# Patient Record
Sex: MALE | Race: WHITE
[De-identification: names, ages, dates, MRNs, and addresses within clinical notes are randomized per-mention and may not be internally consistent; named-entity substitution may affect disease eponyms.]

---

## 2020-04-26 ENCOUNTER — HOSPITAL ENCOUNTER (INPATIENT)
Dept: HOSPITAL 11 - JP.ED | Age: 60
LOS: 11 days | Discharge: HOSPICE | DRG: 853 | End: 2020-05-07
Attending: SURGERY | Admitting: SURGERY
Payer: MEDICARE

## 2020-04-26 DIAGNOSIS — J69.0: ICD-10-CM

## 2020-04-26 DIAGNOSIS — K81.0: Primary | ICD-10-CM

## 2020-04-26 DIAGNOSIS — R94.5: ICD-10-CM

## 2020-04-26 DIAGNOSIS — Z51.5: ICD-10-CM

## 2020-04-26 DIAGNOSIS — R65.21: ICD-10-CM

## 2020-04-26 DIAGNOSIS — Z79.899: ICD-10-CM

## 2020-04-26 DIAGNOSIS — G40.909: ICD-10-CM

## 2020-04-26 DIAGNOSIS — Q90.9: ICD-10-CM

## 2020-04-26 DIAGNOSIS — A41.51: ICD-10-CM

## 2020-04-26 DIAGNOSIS — K80.00: ICD-10-CM

## 2020-04-26 DIAGNOSIS — G30.9: ICD-10-CM

## 2020-04-26 DIAGNOSIS — J96.01: ICD-10-CM

## 2020-04-26 DIAGNOSIS — F02.80: ICD-10-CM

## 2020-04-26 DIAGNOSIS — J18.9: ICD-10-CM

## 2020-04-26 DIAGNOSIS — E86.0: ICD-10-CM

## 2020-04-26 DIAGNOSIS — Z79.890: ICD-10-CM

## 2020-04-26 DIAGNOSIS — H54.7: ICD-10-CM

## 2020-04-26 DIAGNOSIS — E87.6: ICD-10-CM

## 2020-04-26 PROCEDURE — 0FT44ZZ RESECTION OF GALLBLADDER, PERCUTANEOUS ENDOSCOPIC APPROACH: ICD-10-PCS | Performed by: SURGERY

## 2020-04-26 RX ADMIN — TAZOBACTAM SODIUM AND PIPERACILLIN SODIUM SCH MLS/HR: 375; 3 INJECTION, SOLUTION INTRAVENOUS at 19:10

## 2020-04-26 RX ADMIN — POTASSIUM CHLORIDE SCH: 750 CAPSULE, EXTENDED RELEASE ORAL at 14:22

## 2020-04-26 RX ADMIN — POTASSIUM CHLORIDE, SODIUM CHLORIDE, CALCIUM CHLORIDE, SODIUM LACTATE, AND DEXTROSE MONOHYDRATE SCH MLS/HR: 1.79; 6; .2; 3.1; 5 INJECTION, SOLUTION INTRAVENOUS at 11:25

## 2020-04-26 RX ADMIN — TAZOBACTAM SODIUM AND PIPERACILLIN SODIUM SCH MLS/HR: 375; 3 INJECTION, SOLUTION INTRAVENOUS at 13:59

## 2020-04-26 RX ADMIN — POTASSIUM CHLORIDE, SODIUM CHLORIDE, CALCIUM CHLORIDE, SODIUM LACTATE, AND DEXTROSE MONOHYDRATE SCH MLS/HR: 1.79; 6; .2; 3.1; 5 INJECTION, SOLUTION INTRAVENOUS at 21:54

## 2020-04-26 NOTE — PCM.CONS
H&P History of Present Illness





- General


Date of Service: 04/26/20


Admit Problem/Dx: 


 Admission Diagnosis/Problem





Admission Diagnosis/Problem      Acute cholecystitis








Source of Information: Provider.  No: Patient


History Limitations: Reports: Other (Sedation)





- History of Present Illness


Initial Comments - Free Text/Narative: 





CC: lethargic and febrile 





HPI: Trae was sent to the emergency room from his assisted living this morning 

with progressive lethargy and a fever greater than 101.  Trae has recently had 

anesthesia and is not able to provide any usable history.  History is gathered 

from emergency room personnel and notes from the assisted living.  His assisted 

living facility reports that he has been slowly declining over the past 2 weeks 

but more rapidly over the past 2 days.  He has been less interactive and unable 

to feed himself.  He has been refusing to walk.  Today was reportedly the first 

day that he had a fever.





Work-up in the emergency room was concerning for sepsis and acute 

cholecystitis.  He was taken to the operating room and had a laparoscopic 

cholecystectomy.  He continues to be hypotensive with systolic blood pressures 

in the 70s and 80s and occasionally in the 90s.  He is tachycardic.  His lactic 

acid level is elevated.  He has been admitted to the intensive care unit.  I 

was asked consult by Dr. Cuevas regarding management of severe sepsis and 

concern that he may be developing septic shock.





- Related Data


Allergies/Adverse Reactions: 


 Allergies











Allergy/AdvReac Type Severity Reaction Status Date / Time


 


No Known Allergies Allergy   Verified 04/26/20 05:39











Home Medications: 


 Home Meds





Acetaminophen 500 mg PO TID 08/13/19 [History]


Calcium Carbonate [Calcium] 600 mg PO DAILY 08/13/19 [History]


Donepezil HCl 10 mg PO DAILY 08/13/19 [History]


Multivitamin with Minerals [Multiple Vitamin] 1 tab PO DAILY 08/13/19 [History]


Omeprazole 40 mg PO DAILY 08/13/19 [History]


QUEtiapine Fumarate [Quetiapine Fumarate] 50 mg PO TID 08/13/19 [History]


levETIRAcetam [Levetiracetam ER] 750 mg PO BID 08/13/19 [History]


Hydrocortisone [Hydrocortisone 1% Crm] 1 applic TOP BID PRN 02/12/20 [History]


OXcarbazepine [Oxcarbazepine] 150 mg PO BID 02/12/20 [History]


Sertraline [Zoloft] 50 mg PO DAILY 02/12/20 [History]


Potassium Chloride 10 meq PO DAILY 04/26/20 [History]











Past Medical History


HEENT History: Reports: Impaired Vision


Cardiovascular History: Reports: None


Respiratory History: Reports: None


Gastrointestinal History: Reports: None


Genitourinary History: Reports: None


Musculoskeletal History: Reports: Other (See Below)


Other Musculoskeletal History: right hip pain


Neurological History: Reports: None


Psychiatric History: Reports: None


Endocrine/Metabolic History: Reports: None


Hematologic History: Reports: None


Immunologic History: Reports: None


Oncologic (Cancer) History: Reports: None


Dermatologic History: Reports: None





- Past Surgical History


Musculoskeletal Surgical History: Reports: None, Other (See Below)


Other Musculoskeletal Surgeries/Procedures:: cadaver bone LT leg





Social & Family History





- Family History


Family Medical History: Unobtainable (Patient sedated and lethargic)





- Tobacco Use


Smoking Status *Q: Never Smoker





- Caffeine Use


Caffeine Use: Reports: None





- Recreational Drug Use


Recreational Drug Use: No





H&P Review of Systems





- Review of Systems:


Review Of Systems: Unable To Obtain


Reason Not Obtained: Patient is sedated and very lethargic





Exam





- Exam


Exam: See Below





- Vital Signs


Vital Signs: 


 Last Vital Signs











Temp  37.1 C   04/26/20 06:10


 


Pulse  120 H  04/26/20 08:27


 


Resp  18   04/26/20 08:27


 


BP  83/43 L  04/26/20 08:27


 


Pulse Ox  94 L  04/26/20 07:27











Weight: 72.575 kg





- Exam


Quality Assessment: Supplemental Oxygen


General: Lethargic.  No: Alert, Cooperative, Mild Distress


HEENT: Conjunctiva Clear.  No: Mucosa Moist & Pink (Very dry)


Neck: Supple, Trachea Midline


Lungs: Clear to Auscultation, Normal Respiratory Effort


Cardiovascular: Regular Rhythm, Tachycardia.  No: Systolic Murmur


GI/Abdominal Exam: Soft, Tender.  No: Normal Bowel Sounds (Hypoactive)


Extremities: No Pedal Edema.  No: Increased Warmth


Peripheral Pulses: 1+: Dorsalis Pedis (L), Dorsalis Pedis (R)


Skin: Warm, Dry


Neuro Extensive - Mental Status: Withdraws to Pain.  No: Alert


Neuro Extensive - Motor, Sensory, Reflexes: No: Facial Palsy (R), Facial palsy (

L), Tremor


Psychiatric: No: Alert, Agitated





- Patient Data


Lab Results Last 24 hrs: 


 Laboratory Results - last 24 hr











  04/26/20 04/26/20 04/26/20 Range/Units





  05:39 05:50 05:50 


 


WBC   6.7   (4.5-11.0)  K/uL


 


RBC   4.01 L   (4.30-5.90)  M/uL


 


Hgb   13.1   (12.0-15.0)  g/dL


 


Hct   40.0   (40.0-54.0)  %


 


MCV   100 H   (80-98)  fL


 


MCH   33 H   (27-31)  pg


 


MCHC   33   (32-36)  %


 


Plt Count   157   (150-400)  K/uL


 


Add Manual Diff   Yes   


 


Neutrophils % (Manual)   72 H   (36-66)  %


 


Band Neutrophils %   18 H   (5-11)  %


 


Lymphocytes % (Manual)   7 L   (24-44)  %


 


Monocytes % (Manual)   3   (2-6)  %


 


Sodium    143  (140-148)  mmol/L


 


Potassium    3.4 L  (3.6-5.2)  mmol/L


 


Chloride    106  (100-108)  mmol/L


 


Carbon Dioxide    25  (21-32)  mmol/L


 


Anion Gap    15.4 H  (5.0-14.0)  mmol/L


 


BUN    26 H  (7-18)  mg/dL


 


Creatinine    1.1  (0.8-1.3)  mg/dL


 


Est Cr Clr Drug Dosing    69.95  mL/min


 


Estimated GFR (MDRD)    > 60  (>60)  


 


Glucose    86  ()  mg/dL


 


Lactic Acid     (0.4-2.0)  mmol/L


 


Calcium    8.0 L  (8.5-10.1)  mg/dL


 


Total Bilirubin    0.6  (0.2-1.0)  mg/dL


 


AST    128 H  (15-37)  U/L


 


ALT    171 H  (12-78)  U/L


 


Alkaline Phosphatase    291 H  ()  U/L


 


C-Reactive Protein     (0.0-0.3)  mg/dL


 


Total Protein    6.2 L  (6.4-8.2)  g/dL


 


Albumin    2.5 L  (3.4-5.0)  g/dL


 


Globulin    3.7 H  (2.3-3.5)  g/dL


 


Albumin/Globulin Ratio    0.7 L  (1.2-2.2)  


 


Lipase     ()  U/L


 


Urine Color  Yellow    (YELLOW)  


 


Urine Appearance  Clear    (CLEAR)  


 


Urine pH  7.0    (5.0-8.0)  


 


Ur Specific Gravity  1.020    (1.008-1.030)  


 


Urine Protein  100 H    (NEGATIVE)  mg/dL


 


Urine Glucose (UA)  Negative    (NEGATIVE)  mg/dL


 


Urine Ketones  Trace H    (NEGATIVE)  mg/dL


 


Urine Occult Blood  Trace-intact H    (NEGATIVE)  


 


Urine Nitrite  Negative    (NEGATIVE)  


 


Urine Bilirubin  Small H    (NEGATIVE)  


 


Urine Urobilinogen  1.0    (0.2-1.0)  EU/dL


 


Ur Leukocyte Esterase  Negative    (NEGATIVE)  


 


Urine RBC  0-5    (0-5)  


 


Urine WBC  0-5    (0-5)  


 


Ur Epithelial Cells  Few    


 


Amorphous Sediment  Few    


 


Urine Bacteria  Few    


 


Urine Mucus  Few    














  04/26/20 04/26/20 04/26/20 Range/Units





  05:50 06:31 06:33 


 


WBC     (4.5-11.0)  K/uL


 


RBC     (4.30-5.90)  M/uL


 


Hgb     (12.0-15.0)  g/dL


 


Hct     (40.0-54.0)  %


 


MCV     (80-98)  fL


 


MCH     (27-31)  pg


 


MCHC     (32-36)  %


 


Plt Count     (150-400)  K/uL


 


Add Manual Diff     


 


Neutrophils % (Manual)     (36-66)  %


 


Band Neutrophils %     (5-11)  %


 


Lymphocytes % (Manual)     (24-44)  %


 


Monocytes % (Manual)     (2-6)  %


 


Sodium     (140-148)  mmol/L


 


Potassium     (3.6-5.2)  mmol/L


 


Chloride     (100-108)  mmol/L


 


Carbon Dioxide     (21-32)  mmol/L


 


Anion Gap     (5.0-14.0)  mmol/L


 


BUN     (7-18)  mg/dL


 


Creatinine     (0.8-1.3)  mg/dL


 


Est Cr Clr Drug Dosing     mL/min


 


Estimated GFR (MDRD)     (>60)  


 


Glucose     ()  mg/dL


 


Lactic Acid  3.1 H    (0.4-2.0)  mmol/L


 


Calcium     (8.5-10.1)  mg/dL


 


Total Bilirubin     (0.2-1.0)  mg/dL


 


AST     (15-37)  U/L


 


ALT     (12-78)  U/L


 


Alkaline Phosphatase     ()  U/L


 


C-Reactive Protein   19.72 H   (0.0-0.3)  mg/dL


 


Total Protein     (6.4-8.2)  g/dL


 


Albumin     (3.4-5.0)  g/dL


 


Globulin     (2.3-3.5)  g/dL


 


Albumin/Globulin Ratio     (1.2-2.2)  


 


Lipase    32 L  ()  U/L


 


Urine Color     (YELLOW)  


 


Urine Appearance     (CLEAR)  


 


Urine pH     (5.0-8.0)  


 


Ur Specific Gravity     (1.008-1.030)  


 


Urine Protein     (NEGATIVE)  mg/dL


 


Urine Glucose (UA)     (NEGATIVE)  mg/dL


 


Urine Ketones     (NEGATIVE)  mg/dL


 


Urine Occult Blood     (NEGATIVE)  


 


Urine Nitrite     (NEGATIVE)  


 


Urine Bilirubin     (NEGATIVE)  


 


Urine Urobilinogen     (0.2-1.0)  EU/dL


 


Ur Leukocyte Esterase     (NEGATIVE)  


 


Urine RBC     (0-5)  


 


Urine WBC     (0-5)  


 


Ur Epithelial Cells     


 


Amorphous Sediment     


 


Urine Bacteria     


 


Urine Mucus     











Result Diagrams: 


 04/26/20 05:50





 04/26/20 05:50


Roberto Results Last 24 hrs: 


 Microbiology











 04/26/20 09:15 Gram Stain - Final





 Abdominal Fluid - Aspirate 


 


 04/26/20 05:55 Aerobic Blood Culture - Preliminary





 Blood - Venous - Lab Draw 


 


 04/26/20 05:50 Aerobic Blood Culture - Preliminary





 Blood - Venous 











Imaging Impressions Last 24 hrs: 





Chest x-ray-image personally reviewed-lungs clear with no mass, infiltrate or 

effusion.  Heart size is normal.





Right upper quadrant ultrasound-gallbladder is thickened and dilated.  There is 

sludge and a few small stones.  There is significant concern for acute 

cholecystitis versus subacute.  Sonographic Stephens sign was reported as 

negative.





Sepsis Event Note





- Evaluation


Sepsis Screening Result: Severe Sepsis Risk


Current Stage of Sepsis: Severe Sepsis


Possible Source of Sepsis: GI Tract/Intra-abdominal





- Focused Exam


Sepsis Event Note Statement: Focused Sepsis Exam Completed


Vital Signs: 


 Vital Signs











  Temp Pulse Resp BP Pulse Ox


 


 04/26/20 08:27   120 H  18  83/43 L 


 


 04/26/20 08:15   118 H  19  83/40 L 


 


 04/26/20 07:27   119 H  16  89/41 L  94 L


 


 04/26/20 07:12   119 H  19  89/46 L  95


 


 04/26/20 06:42   121 H  18  93/46 L  98


 


 04/26/20 06:27   128 H  17  85/49 L  99


 


 04/26/20 06:22   125 H  20  84/46 L  99


 


 04/26/20 06:12   116 H  22 H  80/42 L  96


 


 04/26/20 06:10  37.1 C    


 


 04/26/20 06:00   122 H  18  86/44 L  97


 


 04/26/20 05:55   123 H  20  87/42 L  91 L


 


 04/26/20 05:54  38.2 C H  133 H  22 H  97/54 L  94 L


 


 04/26/20 05:20  38.2 C H  133 H  18  97/54 L  94 L











Respiratory Effort Without Exertion: Other (see below) (Normal)


Heart Sounds: Other (see below) (Tachycardic)


Capillary Refill, Detail: Less than/Equal to (</=) 2 Seconds


Pulse Description: 1+ Thready


Peripheral Pulse Location: Dorsalis Pedis


Skin Exam (Focused Sepsis): Normal Turgor


Date Exam was Performed: 04/26/20


Time Exam was Performed: 11:18





*Q Meaningful Use (ADM)





- VTE Risk Assess *Q


Each Risk Factor Represents 1 Point: Age 41 - 59 years, Sepsis


Total Score 1 Point Risk Factors: 2


Each Risk Factor Represents 2 Points: Laparoscopic surgery greater than 45 

minutes


Total Score 2 Point Risk Factors: 2


Each Risk Factor Represents 3 Points: None


Total Score 3 Point Risk Factors: 0


Each Risk Factor Represents 5 Points: None


Total Score 5 Point Risk Factors: 0


Venous Thromboembolism Risk Factor Score *Q: 4





Consult PN Assessment/Plan


POD#: 0


Procedures: 


Procedures





OFFICE/OUTPATIENT VISIT NEW (09/03/19)


URINALYSIS AUTO W/O SCOPE (10/25/19)


X-RAY EXAM HIP UNI 2-3 VIEWS (02/11/20)








(1) Severe sepsis


SNOMED Code(s): 42175993


   Code(s): A41.9 - SEPSIS, UNSPECIFIED ORGANISM; R65.20 - SEVERE SEPSIS 

WITHOUT SEPTIC SHOCK   Current Visit: Yes   





(2) Hypokalemia


SNOMED Code(s): 23929991


   Code(s): E87.6 - HYPOKALEMIA   Current Visit: Yes   





(3) Down's syndrome


SNOMED Code(s): 26033978


   Code(s): Q90.9 - DOWN SYNDROME, UNSPECIFIED   Current Visit: No   





(4) Acute cholecystitis


SNOMED Code(s): 76118787


   Code(s): K81.0 - ACUTE CHOLECYSTITIS   Current Visit: Yes   


Problem List Initiated/Reviewed/Updated: Yes


My Orders Last 24 Hours: 


My Active Orders





04/26/20 10:50


LACTIC ACID [CHEM] Routine 





04/26/20 11:03


Admission Status [Patient Status] [ADT] Routine 





04/26/20 11:07


Ondansetron [Zofran ODT]   4 mg PO Q4H PRN 


Ondansetron [Zofran]   4 mg IVPUSH Q4H PRN 





04/26/20 11:15


Dextrose 5%-Lactated Ringers with KCl 20 mEq @ 125 mL/Hr (1000 mL) Dextrose 5%-

Lact Ringers w/KCl [D5 LR with 20 mEq KCl] 1,000 ml IV ASDIRECTED 











Plan: 





ASSESSMENT AND PLAN - 





Acute cholecystitis with severe sepsis-patient still borderline hypotensive and 

tachycardic despite aggressive fluid resuscitation.  He may need vasopressor 

support in the near future.  He is on appropriate broad-spectrum antibiotics.  

Cultures have been obtained.  Lactic acid level will be repeated.  He is in the 

intensive care unit.


-Agree with current aggressive broad-spectrum antibiotics


-Continue IV fluids


-Consider vasopressor support if still hypotensive


-Follow-up cultures





Hypokalemia-will be supplemented.





Down syndrome-significant disability at this time.  Difficulty with 

communication.


-Continue home medications





Maintenance issues - 


- DVT prophylaxis -mechanical today, enoxaparin started tomorrow


- Nutrition -advance per surgical instructions


- Maldonado catheter -placed in the emergency room for strict intake and output 

monitoring in a critically ill patient





Admission justification -this patient will be admitted for inpatient services 

and is medically appropriate meeting medical necessity for inpatient admission 

as outlined in my documentation.  I reasonably expect the patient will require 

inpatient services that span a period time over 2 midnights. I reasonably 

expect this patient to be discharged or transferred within 96 hours after 

admission to the [facility].





Florian Keita M.D.


Requesting Provider: Dr. Cuevas


Date Consult Requested: 04/26/20


Reason for Consult: Severe sepsis


Patient History Reviewed: Yes


Admission H&P Reviewed: No (Not currently available)


Notified Requestor: Yes


Time Spent (in minutes): 60

## 2020-04-26 NOTE — CRLUS
INDICATION:



Pain in the right upper abdomen.



TECHNIQUE:



Abdominal ultrasound.



FINDINGS:



Right kidney measures 11.2 cm and is negative for hydronephrosis. Pancreas 

where seen is grossly normal. Proximal abdominal aorta normal in caliber. 

Liver where seen negative for mass or bile duct dilatation. Proximal 

abdominal inferior cava normal in caliber and patent with flow in the 

appropriate direction. The gallbladder is distended to moderately prominent 

degree. Small stones in the gallbladder. Moderate amount of dense sludge in 

the gallbladder. Gallbladder wall is irregular and moderately thickened 

with measurements of the gallbladder wall being up to 6 mm. Despite the 

absence of a sonographic Stephens`s sign, cholecystitis should be the primary 

consideration and should be excluded. Other gallbladder wall infiltrative 

process such as neoplasm would be less likely since there is no discrete 

mass however this latter finding is not entirely excluded. Common bile duct 

measures 3.9 mm which is within normal limits. Main portal vein is patent. 

The gallbladder was distended to a length of 10-11 cm. Remainder negative.



IMPRESSION:



Moderate irregular dense sludge in the gallbladder with small stones with 

the gallbladder wall being moderately thickened and irregular. Findings 

suggest cholecystitis which could be acute with possible subacute or 

chronic component. Other infiltrative process of the gallbladder such as 

neoplasm is not entirely excluded. No biliary dilatation. The gallbladder 

is prominently distended. Other findings as above.



Dictated by Dylan Almanzar MD @ Apr 26 2020  7:43AM



Signed by Dr. Dylan Almanzar @ Apr 26 2020  7:44AM

## 2020-04-26 NOTE — EDM.PDOC
<Can Carter - Last Filed: 04/26/20 07:18>





ED HPI GENERAL MEDICAL PROBLEM





- General


Chief Complaint: Fever


Stated Complaint: MEDICAL VIA NORTH


Time Seen by Provider: 04/26/20 05:42





- Related Data


 Allergies











Allergy/AdvReac Type Severity Reaction Status Date / Time


 


No Known Allergies Allergy   Verified 04/26/20 05:39











Home Meds: 


 Home Meds





Acetaminophen 500 mg PO TID 08/13/19 [History]


Calcium Carbonate [Calcium] 600 mg PO DAILY 08/13/19 [History]


Donepezil HCl 10 mg PO DAILY 08/13/19 [History]


Multivitamin with Minerals [Multiple Vitamin] 1 tab PO DAILY 08/13/19 [History]


Omeprazole 40 mg PO DAILY 08/13/19 [History]


QUEtiapine Fumarate [Quetiapine Fumarate] 50 mg PO TID 08/13/19 [History]


levETIRAcetam [Levetiracetam ER] 750 mg PO BID 08/13/19 [History]


Hydrocortisone [Hydrocortisone 1% Crm] 1 applic TOP BID PRN 02/12/20 [History]


OXcarbazepine [Oxcarbazepine] 150 mg PO BID 02/12/20 [History]


Sertraline [Zoloft] 50 mg PO DAILY 02/12/20 [History]


Potassium Chloride 10 meq PO DAILY 04/26/20 [History]











Course





- Vital Signs


Text/Narrative:: 





Dr. Cuevas called @ 0720h


Last Recorded V/S: 


 Last Vital Signs











Temp  36.0 C L  04/26/20 16:52


 


Pulse  109 H  04/26/20 18:00


 


Resp  18   04/26/20 18:00


 


BP  94/58 L  04/26/20 18:00


 


Pulse Ox  97   04/26/20 18:00














- Orders/Labs/Meds


Orders: 


 Active Orders 24 hr











 Category Date Time Status


 


 Ambulate [RC] QID Care  04/26/20 08:40 Active


 


 Communication Order [RC] ASDIRECTED Care  04/26/20 08:40 Active


 


 Communication Order [RC] UPON Care  04/26/20 08:40 Active


 


 Dorsiflex/Plantar flex x 10 [RC] Q4HR Care  04/26/20 08:40 Active


 


 Head of Bed Elevation [RC] ASDIRECTED Care  04/26/20 08:40 Active


 


 Intake and Output [RC] ASDIRECTED Care  04/26/20 08:40 Inactive


 


 Notify Provider Vital Signs [RC] ASDIRECTED Care  04/26/20 08:40 Active


 


 Pneumonia Education [RC] UPON Care  04/26/20 08:40 Active


 


 RT Incentive Spirometry [RC] Q1HWA Care  04/26/20 08:40 Active


 


 Ready for Discharge [RC] PER UNIT ROUTINE Care  04/26/20 08:40 Inactive


 


 Ready for Discharge [RC] UPON Care  04/26/20 08:40 Inactive


 


 Turn, Cough, Deep Breathe [RC] .PRN Care  04/26/20 08:40 Active


 


 Up to Chair [RC] ASDIRECTED Care  04/26/20 08:40 Active


 


 Vital Signs [RC] PER UNIT ROUTINE Care  04/26/20 08:40 Inactive


 


 Nutrition Reassessment/Plan, Adult [Consult to Cons  04/26/20 08:42 Active





 Dietician] [CONS] Routine   


 


 Advance Diet Instructions [DIET] Diet  04/26/20 Breakfast Active


 


 Advance Diet Instructions [DIET] Diet  04/26/20 Dinner Active


 


 Chest 1V Frontal [CR] Stat Exams  04/26/20 05:41 Taken


 


 CBC WITH AUTO DIFF [HEME] Routine Lab  04/27/20 05:11 Ordered


 


 COMPREHENSIVE METABOLIC PN,CMP [CHEM] Routine Lab  04/27/20 05:11 Ordered


 


 CULTURE ANAEROBIC [RM] Routine Lab  04/26/20 09:15 Results


 


 CULTURE BLOOD [BC] Urgent Lab  04/26/20 05:50 Results


 


 CULTURE BLOOD [BC] Urgent Lab  04/26/20 05:55 Results


 


 CULTURE URINE [RM] Stat Lab  04/26/20 05:40 Received


 


 CULTURE WOUND + SMEAR [RM] Routine Lab  04/26/20 09:15 Results


 


 Acetaminophen/HYDROcodone [Norco 325-5 MG] Med  04/26/20 08:40 Active





 1 tab PO Q4H PRN   


 


 Benzocaine/Cetylpyrd/Menthol [Cepacol Sore Throat] Med  04/26/20 08:40 Active





 1 lozenge MUCMEM Q4H PRN   


 


 Docusate Sodium [Colace] Med  04/26/20 08:40 Active





 100 mg PO BID PRN   


 


 Donepezil [Aricept] Med  04/26/20 14:00 Active





 10 mg PO DAILY   


 


 Enoxaparin [Lovenox] Med  04/27/20 09:00 Active





 40 mg SUBCUT DAILY   


 


 Hydrocortisone [Hydrocortisone 1% Crm] Med  04/26/20 08:44 Active





 0 gm TOP BID PRN   


 


 OXcarbazepine [Trileptal] Med  04/26/20 21:00 Active





 150 mg PO BID   


 


 Pantoprazole [ProTONIX***] Med  04/26/20 14:00 Active





 40 mg PO ACBREAKFAST   


 


 Piperacillin/Tazobactam/Dext [Zosyn in Dextrose Iso- Med  04/26/20 14:00 Active





 Osmotic 3.375 GM] 3.375 gm   





 Premix Bag 1 bag   





 IV Q6H   


 


 Potassium Chloride Med  04/26/20 14:00 Active





 10 meq PO DAILY   


 


 QUEtiapine [SEROqueL] Med  04/26/20 14:00 Active





 50 mg PO TID   


 


 Ropivacaine [Naropin 0.5%] 36 ml Med  04/26/20 09:00 Active





 dexAMETHasone [Dexamethasone] 8 mg   





 EPINEPHrine [Adrenalin] 0.4 mg   





 Sodium Chloride 0.9% [Normal Saline] 41.6 ml   





 NERVRT ASDIRECTED   


 


 Sertraline [Zoloft] Med  04/26/20 14:00 Active





 50 mg PO DAILY   


 


 Zolpidem [Ambien] Med  04/26/20 08:40 Active





 5 mg PO BEDTIME PRN   


 


 hydrOXYzine HCL [Vistaril] Med  04/26/20 08:40 Active





 100 mg IM Q4H PRN   


 


 levETIRAcetam [Keppra] Med  04/26/20 14:00 Active





 750 mg PO BID   


 


 Abdominal Binder [OM.PC] Routine Oth  04/26/20 08:40 Ordered


 


 Blood Culture x2 Reflex Set [OM.PC] Urgent Oth  04/26/20 05:39 Ordered


 


 Blood Culture x2 Reflex Set [OM.PC] Urgent Oth  04/26/20 10:32 Ordered


 


 Convert IV to Saline Lock [OM.PC] Routine Oth  04/26/20 08:40 Ordered


 


 Procedure Education [OM.PC] Routine Oth  04/26/20 08:40 Ordered


 


 Sequential Compression Device [OM.PC] Routine Oth  04/26/20 08:40 Ordered


 


 Resuscitation Status Routine Resus Stat  04/26/20 08:40 Ordered








 Medication Orders





Hydrocodone Bitart/Acetaminophen (Norco 325-5 Mg)  1 tab PO Q4H PRN


   PRN Reason: Pain (moderate 4-6)


Benzocaine/Menthol (Cepacol Sore Throat)  1 lozenge MUCMEM Q4H PRN


   PRN Reason: Sore Throat


Ropivacaine 36 ml/Dexamethasone 8 mg/Epinephrine HCl 0.4 mg/ Sodium Chloride 

41.6 ml  0 ml NERVRT ASDIRECTED [facility]


   Last Admin: 04/26/20 09:35  Dose: 80 syringe


Docusate Sodium (Colace)  100 mg PO BID PRN


   PRN Reason: Constipation


Donepezil HCl (Aricept)  10 mg PO DAILY [facility]


   Last Admin: 04/26/20 14:22  Dose:  


Enoxaparin Sodium (Lovenox)  40 mg SUBCUT DAILY [facility]


Hydrocortisone (Hydrocortisone 1% Crm)  0 gm TOP BID PRN


   PRN Reason: Rash


Hydroxyzine HCl (Vistaril)  100 mg IM Q4H PRN


   PRN Reason: Breakthrough Pain


Piperacillin/Tazobactam/ (Dextrose 3.375 gm/ Premix)  50 mls @ 100 mls/hr IV 

Q6H [facility]


   Last Admin: 04/26/20 13:59  Dose: 100 mls/hr


Potassium Cl/Dextrose/Lact Ringer's (D5 Lr With 20 Meq Kcl)  1,000 mls @ 125 mls

/hr IV ASDIRECTED [facility]


   Last Admin: 04/26/20 11:25  Dose: 125 mls/hr


Vancomycin HCl 1 gm/ Sodium (Chloride)  250 mls @ 166.667 mls/hr IV Q12H [facility]


   Last Admin: 04/26/20 12:13  Dose: 166.667 mls/hr


Norepinephrine Bitartrate 4 mg (/ Dextrose/Water)  250 mls @ 7.5 mls/hr IV 

TITRATE [facility]; Protocol


   Last Admin: 04/26/20 13:11  Dose: 2 mcg/min, 7.5 mls/hr


Levetiracetam (Keppra)  750 mg PO BID [facility]


   Last Admin: 04/26/20 14:22  Dose:  


Ondansetron HCl (Zofran Odt)  4 mg PO Q4H PRN


   PRN Reason: Nausea/Vomiting


Ondansetron HCl (Zofran)  4 mg IVPUSH Q4H PRN


   PRN Reason: Nausea/Vomiting


Oxcarbazepine (Trileptal)  150 mg PO BID [facility]


Pantoprazole Sodium (Protonix***)  40 mg PO ACBREAKFAST [facility]


   Last Admin: 04/26/20 14:23  Dose:  


Potassium Chloride (Potassium Chloride)  10 meq PO DAILY [facility]


   Last Admin: 04/26/20 14:22  Dose:  


Quetiapine Fumarate (Seroquel)  50 mg PO TID [facility]


   Last Admin: 04/26/20 14:23  Dose:  


Sertraline HCl (Zoloft)  50 mg PO DAILY [facility]


   Last Admin: 04/26/20 14:23  Dose:  


Zolpidem Tartrate (Ambien)  5 mg PO BEDTIME PRN


   PRN Reason: Sleep








Labs: 


 Laboratory Tests











  04/26/20 04/26/20 04/26/20 Range/Units





  05:39 05:50 05:50 


 


WBC   6.7   (4.5-11.0)  K/uL


 


RBC   4.01 L   (4.30-5.90)  M/uL


 


Hgb   13.1   (12.0-15.0)  g/dL


 


Hct   40.0   (40.0-54.0)  %


 


MCV   100 H   (80-98)  fL


 


MCH   33 H   (27-31)  pg


 


MCHC   33   (32-36)  %


 


Plt Count   157   (150-400)  K/uL


 


Add Manual Diff   Yes   


 


Neutrophils % (Manual)   72 H   (36-66)  %


 


Band Neutrophils %   18 H   (5-11)  %


 


Lymphocytes % (Manual)   7 L   (24-44)  %


 


Monocytes % (Manual)   3   (2-6)  %


 


Sodium    143  (140-148)  mmol/L


 


Potassium    3.4 L  (3.6-5.2)  mmol/L


 


Chloride    106  (100-108)  mmol/L


 


Carbon Dioxide    25  (21-32)  mmol/L


 


Anion Gap    15.4 H  (5.0-14.0)  mmol/L


 


BUN    26 H  (7-18)  mg/dL


 


Creatinine    1.1  (0.8-1.3)  mg/dL


 


Est Cr Clr Drug Dosing    69.95  mL/min


 


Estimated GFR (MDRD)    > 60  (>60)  


 


Glucose    86  ()  mg/dL


 


Lactic Acid     (0.4-2.0)  mmol/L


 


Calcium    8.0 L  (8.5-10.1)  mg/dL


 


Total Bilirubin    0.6  (0.2-1.0)  mg/dL


 


AST    128 H  (15-37)  U/L


 


ALT    171 H  (12-78)  U/L


 


Alkaline Phosphatase    291 H  ()  U/L


 


C-Reactive Protein     (0.0-0.3)  mg/dL


 


Total Protein    6.2 L  (6.4-8.2)  g/dL


 


Albumin    2.5 L  (3.4-5.0)  g/dL


 


Globulin    3.7 H  (2.3-3.5)  g/dL


 


Albumin/Globulin Ratio    0.7 L  (1.2-2.2)  


 


Lipase     ()  U/L


 


Urine Color  Yellow    (YELLOW)  


 


Urine Appearance  Clear    (CLEAR)  


 


Urine pH  7.0    (5.0-8.0)  


 


Ur Specific Gravity  1.020    (1.008-1.030)  


 


Urine Protein  100 H    (NEGATIVE)  mg/dL


 


Urine Glucose (UA)  Negative    (NEGATIVE)  mg/dL


 


Urine Ketones  Trace H    (NEGATIVE)  mg/dL


 


Urine Occult Blood  Trace-intact H    (NEGATIVE)  


 


Urine Nitrite  Negative    (NEGATIVE)  


 


Urine Bilirubin  Small H    (NEGATIVE)  


 


Urine Urobilinogen  1.0    (0.2-1.0)  EU/dL


 


Ur Leukocyte Esterase  Negative    (NEGATIVE)  


 


Urine RBC  0-5    (0-5)  


 


Urine WBC  0-5    (0-5)  


 


Ur Epithelial Cells  Few    


 


Amorphous Sediment  Few    


 


Urine Bacteria  Few    


 


Urine Mucus  Few    














  04/26/20 04/26/20 04/26/20 Range/Units





  05:50 06:31 06:33 


 


WBC     (4.5-11.0)  K/uL


 


RBC     (4.30-5.90)  M/uL


 


Hgb     (12.0-15.0)  g/dL


 


Hct     (40.0-54.0)  %


 


MCV     (80-98)  fL


 


MCH     (27-31)  pg


 


MCHC     (32-36)  %


 


Plt Count     (150-400)  K/uL


 


Add Manual Diff     


 


Neutrophils % (Manual)     (36-66)  %


 


Band Neutrophils %     (5-11)  %


 


Lymphocytes % (Manual)     (24-44)  %


 


Monocytes % (Manual)     (2-6)  %


 


Sodium     (140-148)  mmol/L


 


Potassium     (3.6-5.2)  mmol/L


 


Chloride     (100-108)  mmol/L


 


Carbon Dioxide     (21-32)  mmol/L


 


Anion Gap     (5.0-14.0)  mmol/L


 


BUN     (7-18)  mg/dL


 


Creatinine     (0.8-1.3)  mg/dL


 


Est Cr Clr Drug Dosing     mL/min


 


Estimated GFR (MDRD)     (>60)  


 


Glucose     ()  mg/dL


 


Lactic Acid  3.1 H    (0.4-2.0)  mmol/L


 


Calcium     (8.5-10.1)  mg/dL


 


Total Bilirubin     (0.2-1.0)  mg/dL


 


AST     (15-37)  U/L


 


ALT     (12-78)  U/L


 


Alkaline Phosphatase     ()  U/L


 


C-Reactive Protein   19.72 H   (0.0-0.3)  mg/dL


 


Total Protein     (6.4-8.2)  g/dL


 


Albumin     (3.4-5.0)  g/dL


 


Globulin     (2.3-3.5)  g/dL


 


Albumin/Globulin Ratio     (1.2-2.2)  


 


Lipase    32 L  ()  U/L


 


Urine Color     (YELLOW)  


 


Urine Appearance     (CLEAR)  


 


Urine pH     (5.0-8.0)  


 


Ur Specific Gravity     (1.008-1.030)  


 


Urine Protein     (NEGATIVE)  mg/dL


 


Urine Glucose (UA)     (NEGATIVE)  mg/dL


 


Urine Ketones     (NEGATIVE)  mg/dL


 


Urine Occult Blood     (NEGATIVE)  


 


Urine Nitrite     (NEGATIVE)  


 


Urine Bilirubin     (NEGATIVE)  


 


Urine Urobilinogen     (0.2-1.0)  EU/dL


 


Ur Leukocyte Esterase     (NEGATIVE)  


 


Urine RBC     (0-5)  


 


Urine WBC     (0-5)  


 


Ur Epithelial Cells     


 


Amorphous Sediment     


 


Urine Bacteria     


 


Urine Mucus     














  04/26/20 Range/Units





  10:50 


 


WBC   (4.5-11.0)  K/uL


 


RBC   (4.30-5.90)  M/uL


 


Hgb   (12.0-15.0)  g/dL


 


Hct   (40.0-54.0)  %


 


MCV   (80-98)  fL


 


MCH   (27-31)  pg


 


MCHC   (32-36)  %


 


Plt Count   (150-400)  K/uL


 


Add Manual Diff   


 


Neutrophils % (Manual)   (36-66)  %


 


Band Neutrophils %   (5-11)  %


 


Lymphocytes % (Manual)   (24-44)  %


 


Monocytes % (Manual)   (2-6)  %


 


Sodium   (140-148)  mmol/L


 


Potassium   (3.6-5.2)  mmol/L


 


Chloride   (100-108)  mmol/L


 


Carbon Dioxide   (21-32)  mmol/L


 


Anion Gap   (5.0-14.0)  mmol/L


 


BUN   (7-18)  mg/dL


 


Creatinine   (0.8-1.3)  mg/dL


 


Est Cr Clr Drug Dosing   mL/min


 


Estimated GFR (MDRD)   (>60)  


 


Glucose   ()  mg/dL


 


Lactic Acid  4.8 H  (0.4-2.0)  mmol/L


 


Calcium   (8.5-10.1)  mg/dL


 


Total Bilirubin   (0.2-1.0)  mg/dL


 


AST   (15-37)  U/L


 


ALT   (12-78)  U/L


 


Alkaline Phosphatase   ()  U/L


 


C-Reactive Protein   (0.0-0.3)  mg/dL


 


Total Protein   (6.4-8.2)  g/dL


 


Albumin   (3.4-5.0)  g/dL


 


Globulin   (2.3-3.5)  g/dL


 


Albumin/Globulin Ratio   (1.2-2.2)  


 


Lipase   ()  U/L


 


Urine Color   (YELLOW)  


 


Urine Appearance   (CLEAR)  


 


Urine pH   (5.0-8.0)  


 


Ur Specific Gravity   (1.008-1.030)  


 


Urine Protein   (NEGATIVE)  mg/dL


 


Urine Glucose (UA)   (NEGATIVE)  mg/dL


 


Urine Ketones   (NEGATIVE)  mg/dL


 


Urine Occult Blood   (NEGATIVE)  


 


Urine Nitrite   (NEGATIVE)  


 


Urine Bilirubin   (NEGATIVE)  


 


Urine Urobilinogen   (0.2-1.0)  EU/dL


 


Ur Leukocyte Esterase   (NEGATIVE)  


 


Urine RBC   (0-5)  


 


Urine WBC   (0-5)  


 


Ur Epithelial Cells   


 


Amorphous Sediment   


 


Urine Bacteria   


 


Urine Mucus   











Meds: 


Medications











Generic Name Dose Route Start Last Admin





  Trade Name Freq  PRN Reason Stop Dose Admin


 


Hydrocodone Bitart/Acetaminophen  1 tab  04/26/20 08:40  





  Norco 325-5 Mg  PO   





  Q4H PRN   





  Pain (moderate 4-6)   





     





     





     


 


Benzocaine/Menthol  1 lozenge  04/26/20 08:40  





  Cepacol Sore Throat  MUCMEM   





  Q4H PRN   





  Sore Throat   





     





     





     


 


Ropivacaine 36 ml/  0 ml  04/26/20 09:00  04/26/20 09:35





Dexamethasone 8 mg/  NERVRT   80 syringe





Epinephrine HCl 0.4 mg/ Sodium  ASDIRECTED VASYL   Administration





Chloride 41.6 ml     





     





     





     


 


Docusate Sodium  100 mg  04/26/20 08:40  





  Colace  PO   





  BID PRN   





  Constipation   





     





     





     


 


Donepezil HCl  10 mg  04/26/20 14:00  04/26/20 14:22





  Aricept  PO   Not Given





  DAILY VASYL   





     





     





     





     


 


Enoxaparin Sodium  40 mg  04/27/20 09:00  





  Lovenox  SUBCUT   





  DAILY VASYL   





     





     





     





     


 


Hydrocortisone  0 gm  04/26/20 08:44  





  Hydrocortisone 1% Crm  TOP   





  BID PRN   





  Rash   





     





     





     


 


Hydroxyzine HCl  100 mg  04/26/20 08:40  





  Vistaril  IM   





  Q4H PRN   





  Breakthrough Pain   





     





     





     


 


Piperacillin/Tazobactam/  50 mls @ 100 mls/hr  04/26/20 14:00  04/26/20 13:59





  Dextrose 3.375 gm/ Premix  IV   100 mls/hr





  Q6H VASYL   Administration





     





     





     





     


 


Potassium Cl/Dextrose/Lact Ringer's  1,000 mls @ 125 mls/hr  04/26/20 11:15  04/ 26/20 11:25





  D5 Lr With 20 Meq Kcl  IV   125 mls/hr





  ASDIRECTED VASYL   Administration





     





     





     





     


 


Vancomycin HCl 1 gm/ Sodium  250 mls @ 166.667 mls/hr  04/26/20 12:00  04/26/20 

12:13





  Chloride  IV   166.667 mls/hr





  Q12H VASYL   Administration





     





     





     





     


 


Norepinephrine Bitartrate 4 mg  250 mls @ 7.5 mls/hr  04/26/20 12:45  04/26/20 

13:11





  / Dextrose/Water  IV   2 mcg/min





  TITRATE VASYL   7.5 mls/hr





     Administration





     





  Protocol   





  2 MCG/MIN   


 


Levetiracetam  750 mg  04/26/20 14:00  04/26/20 14:22





  Keppra  PO   Not Given





  BID [facility]   





     





     





     





     


 


Ondansetron HCl  4 mg  04/26/20 11:07  





  Zofran Odt  PO   





  Q4H PRN   





  Nausea/Vomiting   





     





     





     


 


Ondansetron HCl  4 mg  04/26/20 11:07  





  Zofran  IVPUSH   





  Q4H PRN   





  Nausea/Vomiting   





     





     





     


 


Oxcarbazepine  150 mg  04/26/20 21:00  





  Trileptal  PO   





  BID [facility]   





     





     





     





     


 


Pantoprazole Sodium  40 mg  04/26/20 14:00  04/26/20 14:23





  Protonix***  PO   Not Given





  ACBREAKFAST [facility]   





     





     





     





     


 


Potassium Chloride  10 meq  04/26/20 14:00  04/26/20 14:22





  Potassium Chloride  PO   Not Given





  DAILY [facility]   





     





     





     





     


 


Quetiapine Fumarate  50 mg  04/26/20 14:00  04/26/20 14:23





  Seroquel  PO   Not Given





  TID [facility]   





     





     





     





     


 


Sertraline HCl  50 mg  04/26/20 14:00  04/26/20 14:23





  Zoloft  PO   Not Given





  DAILY [facility]   





     





     





     





     


 


Zolpidem Tartrate  5 mg  04/26/20 08:40  





  Ambien  PO   





  BEDTIME PRN   





  Sleep   





     





     





     














Discontinued Medications














Generic Name Dose Route Start Last Admin





  Trade Name Mingo  PRN Reason Stop Dose Admin


 


Acetaminophen  650 mg  04/26/20 05:58  04/26/20 06:03





  Tylenol  RECTAL  04/26/20 05:59  650 mg





  NOW ONE   Administration





     





     





     





     


 


Bupivacaine HCl  Confirm  04/26/20 08:34  04/26/20 09:29





  Marcaine 0.5%  Administered  04/26/20 08:35  20 ml





  Dose   Administration





  50 ml   





  .ROUTE   





  .STK-MED ONE   





     





     





     





     


 


Dexamethasone  Confirm  04/26/20 08:41  





  Dexamethasone  Administered  04/26/20 08:42  





  Dose   





  4 mg   





  .ROUTE   





  .STK-MED ONE   





     





     





     





     


 


Fentanyl  Confirm  04/26/20 08:41  





  Sublimaze  Administered  04/26/20 08:42  





  Dose   





  250 mcg   





  .ROUTE   





  .STK-MED ONE   





     





     





     





     


 


Glycopyrrolate  Confirm  04/26/20 08:41  





  Robinul  Administered  04/26/20 08:42  





  Dose   





  1 mg   





  .ROUTE   





  .STK-MED ONE   





     





     





     





     


 


Sodium Chloride  1,000 mls @ 999 mls/hr  04/26/20 05:45  04/26/20 05:40





  Normal Saline  IV   999 mls/hr





  ASDIRECTED VASYL   Administration





     





     





     





     


 


Sodium Chloride  1,000 mls @ 999 mls/hr  04/26/20 05:45  04/26/20 06:30





  Normal Saline  IV   999 mls/hr





  ASDIRECTED VASYL   Administration





     





     





     





     


 


Piperacillin Sod/Tazobactam  100 mls @ 100 mls/hr  04/26/20 06:17  04/26/20 06:

35





  Sod 4.5 gm/ Sodium Chloride  IV  04/26/20 07:16  100 mls/hr





  ONETIME ONE   Administration





     





     





     





     


 


Aztreonam 1 gm/ Sodium  50 mls @ 100 mls/hr  04/26/20 06:32  04/26/20 06:53





  Chloride  IV  04/26/20 07:01  100 mls/hr





  ONETIME ONE   Administration





     





     





     





     


 


Sodium Chloride  Confirm  04/26/20 06:32  04/26/20 06:40





  Normal Saline  Administered  04/26/20 06:33  Not Given





  Dose   





  100 mls @ as directed   





  .ROUTE   





  .STK-MED ONE   





     





     





     





     


 


Sodium Chloride  Confirm  04/26/20 06:33  04/26/20 06:40





  Normal Saline  Administered  04/26/20 06:34  Not Given





  Dose   





  100 mls @ as directed   





  .ROUTE   





  .STK-MED ONE   





     





     





     





     


 


Sodium Chloride  1,000 mls @ 500 mls/hr  04/26/20 06:45  04/26/20 07:36





  Normal Saline  IV   500 mls/hr





  ASDIRECTED VASYL   Administration





     





     





     





     


 


Lactated Ringer's  Confirm  04/26/20 09:00  





  Ringers, Lactated  Administered  04/26/20 09:01  





  Dose   





  1,000 mls @ as directed   





  .ROUTE   





  .STK-MED ONE   





     





     





     





     


 


Sodium Chloride  Confirm  04/26/20 09:04  





  Normal Saline  Administered  04/26/20 09:05  





  Dose   





  10 mls @ as directed   





  .ROUTE   





  .STK-MED ONE   





     





     





     





     


 


Lactated Ringer's  Confirm  04/26/20 09:07  





  Ringers, Lactated  Administered  04/26/20 09:08  





  Dose   





  1,000 mls @ as directed   





  .ROUTE   





  .STK-MED ONE   





     





     





     





     


 


Lactated Ringer's  Confirm  04/26/20 09:37  





  Ringers, Lactated  Administered  04/26/20 09:38  





  Dose   





  1,000 mls @ as directed   





  .ROUTE   





  .STK-MED ONE   





     





     





     





     


 


Lactated Ringer's  1,000 ml  04/26/20 09:15  04/26/20 09:15





  Ringers, Lactated  IRR  04/26/20 09:16  1,000 ml





  .STK-MED ONE   Administration





     





     





     





     


 


Lidocaine/Epinephrine  Confirm  04/26/20 08:34  04/26/20 09:29





  Xylocaine 1% With Epinephrine 1:100,000  Administered  04/26/20 08:35  20 ml





  Dose   Administration





  50 ml   





  .ROUTE   





  .STK-MED ONE   





     





     





     





     


 


Neostigmine Methylsulfate  Confirm  04/26/20 08:41  





  Neostigmine  Administered  04/26/20 08:42  





  Dose   





  5 mg   





  .ROUTE   





  .STK-MED ONE   





     





     





     





     


 


Ondansetron HCl  Confirm  04/26/20 08:41  





  Zofran  Administered  04/26/20 08:42  





  Dose   





  4 mg   





  .ROUTE   





  .STK-MED ONE   





     





     





     





     


 


Phenylephrine HCl  Confirm  04/26/20 09:04  





  Niraj-Synephrine  Administered  04/26/20 09:05  





  Dose   





  10 mg   





  .ROUTE   





  .STK-MED ONE   





     





     





     





     


 


Piperacillin Sod/Tazobactam Sod  Confirm  04/26/20 06:30  04/26/20 06:39





  Zosyn  Administered  04/26/20 06:31  Not Given





  Dose   





  4.5 gm   





  .ROUTE   





  .STK-MED ONE   





     





     





     





     


 


Propofol  Confirm  04/26/20 08:41  





  Diprivan  20 Ml  Administered  04/26/20 08:42  





  Dose   





  200 mg   





  .ROUTE   





  .STK-MED ONE   





     





     





     





     


 


Rocuronium Bromide  Confirm  04/26/20 08:41  





  Zemuron  Administered  04/26/20 08:42  





  Dose   





  50 mg   





  .ROUTE   





  .STK-MED ONE   





     





     





     





     


 


Succinylcholine Chloride  Confirm  04/26/20 08:41  





  Quelicin  Administered  04/26/20 08:42  





  Dose   





  200 mg   





  .ROUTE   





  .STK-MED ONE   





     





     





     





     














- Radiology Interpretation


Free Text/Narrative:: 





GB ultrasound-cholecystitis





Departure





- Departure


Time of Disposition: 07:45


Disposition: Admitted As Inpatient 66


Condition: Fair


Clinical Impression: 


 Acute cholecystitis








- Discharge Information


*PRESCRIPTION DRUG MONITORING PROGRAM REVIEWED*: Not Applicable


*COPY OF PRESCRIPTION DRUG MONITORING REPORT IN PATIENT PRUDENCE: Not Applicable





Sepsis Event Note





- Focused Exam


Vital Signs: 


 Vital Signs











  Temp Pulse Resp BP Pulse Ox


 


 04/26/20 11:00   122 H  18  85/48 L  98


 


 04/26/20 10:55  36.9 C  124 H  18  93/50 L  98


 


 04/26/20 10:50   127 H  18  92/49 L  98


 


 04/26/20 10:45   124 H  16  94/50 L  96


 


 04/26/20 10:40  36.4 C  130 H  18  98/53 L  99


 


 04/26/20 08:27   120 H  18  83/43 L 


 


 04/26/20 08:15   118 H  19  83/40 L 


 


 04/26/20 07:27   119 H  16  89/41 L  94 L


 


 04/26/20 07:12   119 H  19  89/46 L  95


 


 04/26/20 06:42   121 H  18  93/46 L  98


 


 04/26/20 06:27   128 H  17  85/49 L  99


 


 04/26/20 06:22   125 H  20  84/46 L  99











Date Exam was Performed: 04/26/20


Time Exam was Performed: 07:18





- My Orders


Last 24 Hours: 


My Active Orders





04/26/20 05:39


Blood Culture x2 Reflex Set [OM.PC] Urgent 





04/26/20 05:40


CULTURE URINE [RM] Stat 





04/26/20 05:41


Chest 1V Frontal [CR] Stat 





04/26/20 05:50


CULTURE BLOOD [BC] Urgent 





04/26/20 05:55


CULTURE BLOOD [BC] Urgent 














- Assessment/Plan


Last 24 Hours: 


My Active Orders





04/26/20 05:39


Blood Culture x2 Reflex Set [OM.PC] Urgent 





04/26/20 05:40


CULTURE URINE [RM] Stat 





04/26/20 05:41


Chest 1V Frontal [CR] Stat 





04/26/20 05:50


CULTURE BLOOD [BC] Urgent 





04/26/20 05:55


CULTURE BLOOD [BC] Urgent 














<Erica Olvera - Last Filed: 04/26/20 18:14>





ED HPI GENERAL MEDICAL PROBLEM





- General


Source of Information: Reports: Patient


History Limitations: Reports: No Limitations





- History of Present Illness


INITIAL COMMENTS - FREE TEXT/NARRATIVE: 





pt arrived with a fever of 101. he had a temp at [facility] of 104. He has 

not been as active for the past 2 days. He has not been vomiting. 


Onset: Today


Duration: Hour(s):


Location: Reports: Generalized


Associated Symptoms: Reports: Fever/Chills, Malaise





Past Medical History


HEENT History: Reports: Impaired Vision


Cardiovascular History: Reports: None


Respiratory History: Reports: None


Gastrointestinal History: Reports: None


Genitourinary History: Reports: None


Musculoskeletal History: Reports: Other (See Below)


Other Musculoskeletal History: right hip pain


Neurological History: Reports: None


Psychiatric History: Reports: None


Endocrine/Metabolic History: Reports: None


Hematologic History: Reports: None


Immunologic History: Reports: None


Oncologic (Cancer) History: Reports: None


Dermatologic History: Reports: None





- Past Surgical History


Musculoskeletal Surgical History: Reports: None, Other (See Below)


Other Musculoskeletal Surgeries/Procedures:: cadaver bone LT leg





Social & Family History





- Tobacco Use


Smoking Status *Q: Never Smoker





- Caffeine Use


Caffeine Use: Reports: None





- Recreational Drug Use


Recreational Drug Use: No





ED ROS GENERAL





- Review of Systems


Review Of Systems: See Below


Constitutional: Reports: Fever, Chills, Malaise, Weakness


HEENT: Reports: No Symptoms


Respiratory: Reports: No Symptoms


Cardiovascular: Reports: No Symptoms


Endocrine: Reports: No Symptoms


GI/Abdominal: Reports: No Symptoms


: Reports: No Symptoms


Musculoskeletal: Reports: No Symptoms


Skin: Reports: No Symptoms


Neurological: Reports: Confusion, Other (increased. Pt os a downs syndrome in 

the memory unit at [facility]. )


Psychiatric: Reports: Confusion





ED EXAM, SEPSIS





- Physical Exam


Exam: See Below


Text/Narrative:: 





pt is a pale appear person with elevated temp. He has not had a cough. He has 

been less active for the past 2 days. He has not been vomiting. 


Exam Limited By: No Limitations


General Appearance: Alert, No Apparent Distress, Anxious


Ears: Normal TMs


Nose: Normal Inspection


Throat/Mouth: Other (mouth is dry. )


Head: Atraumatic


Neck: Normal Inspection


Respiratory/Chest: No Respiratory Distress


Cardiovascular: Regular Rate, Rhythm, Tachycardia


GI/Abdominal Exam: Soft, Non-Tender


 (Male) Exam: Deferred


Rectal (Males) Exam: Deferred


Extremities: Normal Inspection


Neurological: Alert, Other (pt is normally confused. )





Course





- Orders/Labs/Meds


Labs: 


 Laboratory Tests











  04/26/20 04/26/20 04/26/20 Range/Units





  05:39 05:50 05:50 


 


WBC   6.7   (4.5-11.0)  K/uL


 


RBC   4.01 L   (4.30-5.90)  M/uL


 


Hgb   13.1   (12.0-15.0)  g/dL


 


Hct   40.0   (40.0-54.0)  %


 


MCV   100 H   (80-98)  fL


 


MCH   33 H   (27-31)  pg


 


MCHC   33   (32-36)  %


 


Plt Count   157   (150-400)  K/uL


 


Add Manual Diff   Yes   


 


Neutrophils % (Manual)   72 H   (36-66)  %


 


Band Neutrophils %   18 H   (5-11)  %


 


Lymphocytes % (Manual)   7 L   (24-44)  %


 


Monocytes % (Manual)   3   (2-6)  %


 


Sodium    143  (140-148)  mmol/L


 


Potassium    3.4 L  (3.6-5.2)  mmol/L


 


Chloride    106  (100-108)  mmol/L


 


Carbon Dioxide    25  (21-32)  mmol/L


 


Anion Gap    15.4 H  (5.0-14.0)  mmol/L


 


BUN    26 H  (7-18)  mg/dL


 


Creatinine    1.1  (0.8-1.3)  mg/dL


 


Est Cr Clr Drug Dosing    69.95  mL/min


 


Estimated GFR (MDRD)    > 60  (>60)  


 


Glucose    86  ()  mg/dL


 


Lactic Acid     (0.4-2.0)  mmol/L


 


Calcium    8.0 L  (8.5-10.1)  mg/dL


 


Total Bilirubin    0.6  (0.2-1.0)  mg/dL


 


AST    128 H  (15-37)  U/L


 


ALT    171 H  (12-78)  U/L


 


Alkaline Phosphatase    291 H  ()  U/L


 


C-Reactive Protein     (0.0-0.3)  mg/dL


 


Total Protein    6.2 L  (6.4-8.2)  g/dL


 


Albumin    2.5 L  (3.4-5.0)  g/dL


 


Globulin    3.7 H  (2.3-3.5)  g/dL


 


Albumin/Globulin Ratio    0.7 L  (1.2-2.2)  


 


Lipase     ()  U/L


 


Urine Color  Yellow    (YELLOW)  


 


Urine Appearance  Clear    (CLEAR)  


 


Urine pH  7.0    (5.0-8.0)  


 


Ur Specific Gravity  1.020    (1.008-1.030)  


 


Urine Protein  100 H    (NEGATIVE)  mg/dL


 


Urine Glucose (UA)  Negative    (NEGATIVE)  mg/dL


 


Urine Ketones  Trace H    (NEGATIVE)  mg/dL


 


Urine Occult Blood  Trace-intact H    (NEGATIVE)  


 


Urine Nitrite  Negative    (NEGATIVE)  


 


Urine Bilirubin  Small H    (NEGATIVE)  


 


Urine Urobilinogen  1.0    (0.2-1.0)  EU/dL


 


Ur Leukocyte Esterase  Negative    (NEGATIVE)  


 


Urine RBC  0-5    (0-5)  


 


Urine WBC  0-5    (0-5)  


 


Ur Epithelial Cells  Few    


 


Amorphous Sediment  Few    


 


Urine Bacteria  Few    


 


Urine Mucus  Few    














  04/26/20 04/26/20 04/26/20 Range/Units





  05:50 06:31 06:33 


 


WBC     (4.5-11.0)  K/uL


 


RBC     (4.30-5.90)  M/uL


 


Hgb     (12.0-15.0)  g/dL


 


Hct     (40.0-54.0)  %


 


MCV     (80-98)  fL


 


MCH     (27-31)  pg


 


MCHC     (32-36)  %


 


Plt Count     (150-400)  K/uL


 


Add Manual Diff     


 


Neutrophils % (Manual)     (36-66)  %


 


Band Neutrophils %     (5-11)  %


 


Lymphocytes % (Manual)     (24-44)  %


 


Monocytes % (Manual)     (2-6)  %


 


Sodium     (140-148)  mmol/L


 


Potassium     (3.6-5.2)  mmol/L


 


Chloride     (100-108)  mmol/L


 


Carbon Dioxide     (21-32)  mmol/L


 


Anion Gap     (5.0-14.0)  mmol/L


 


BUN     (7-18)  mg/dL


 


Creatinine     (0.8-1.3)  mg/dL


 


Est Cr Clr Drug Dosing     mL/min


 


Estimated GFR (MDRD)     (>60)  


 


Glucose     ()  mg/dL


 


Lactic Acid  3.1 H    (0.4-2.0)  mmol/L


 


Calcium     (8.5-10.1)  mg/dL


 


Total Bilirubin     (0.2-1.0)  mg/dL


 


AST     (15-37)  U/L


 


ALT     (12-78)  U/L


 


Alkaline Phosphatase     ()  U/L


 


C-Reactive Protein   19.72 H   (0.0-0.3)  mg/dL


 


Total Protein     (6.4-8.2)  g/dL


 


Albumin     (3.4-5.0)  g/dL


 


Globulin     (2.3-3.5)  g/dL


 


Albumin/Globulin Ratio     (1.2-2.2)  


 


Lipase    32 L  ()  U/L


 


Urine Color     (YELLOW)  


 


Urine Appearance     (CLEAR)  


 


Urine pH     (5.0-8.0)  


 


Ur Specific Gravity     (1.008-1.030)  


 


Urine Protein     (NEGATIVE)  mg/dL


 


Urine Glucose (UA)     (NEGATIVE)  mg/dL


 


Urine Ketones     (NEGATIVE)  mg/dL


 


Urine Occult Blood     (NEGATIVE)  


 


Urine Nitrite     (NEGATIVE)  


 


Urine Bilirubin     (NEGATIVE)  


 


Urine Urobilinogen     (0.2-1.0)  EU/dL


 


Ur Leukocyte Esterase     (NEGATIVE)  


 


Urine RBC     (0-5)  


 


Urine WBC     (0-5)  


 


Ur Epithelial Cells     


 


Amorphous Sediment     


 


Urine Bacteria     


 


Urine Mucus     














  04/26/20 Range/Units





  10:50 


 


WBC   (4.5-11.0)  K/uL


 


RBC   (4.30-5.90)  M/uL


 


Hgb   (12.0-15.0)  g/dL


 


Hct   (40.0-54.0)  %


 


MCV   (80-98)  fL


 


MCH   (27-31)  pg


 


MCHC   (32-36)  %


 


Plt Count   (150-400)  K/uL


 


Add Manual Diff   


 


Neutrophils % (Manual)   (36-66)  %


 


Band Neutrophils %   (5-11)  %


 


Lymphocytes % (Manual)   (24-44)  %


 


Monocytes % (Manual)   (2-6)  %


 


Sodium   (140-148)  mmol/L


 


Potassium   (3.6-5.2)  mmol/L


 


Chloride   (100-108)  mmol/L


 


Carbon Dioxide   (21-32)  mmol/L


 


Anion Gap   (5.0-14.0)  mmol/L


 


BUN   (7-18)  mg/dL


 


Creatinine   (0.8-1.3)  mg/dL


 


Est Cr Clr Drug Dosing   mL/min


 


Estimated GFR (MDRD)   (>60)  


 


Glucose   ()  mg/dL


 


Lactic Acid  4.8 H  (0.4-2.0)  mmol/L


 


Calcium   (8.5-10.1)  mg/dL


 


Total Bilirubin   (0.2-1.0)  mg/dL


 


AST   (15-37)  U/L


 


ALT   (12-78)  U/L


 


Alkaline Phosphatase   ()  U/L


 


C-Reactive Protein   (0.0-0.3)  mg/dL


 


Total Protein   (6.4-8.2)  g/dL


 


Albumin   (3.4-5.0)  g/dL


 


Globulin   (2.3-3.5)  g/dL


 


Albumin/Globulin Ratio   (1.2-2.2)  


 


Lipase   ()  U/L


 


Urine Color   (YELLOW)  


 


Urine Appearance   (CLEAR)  


 


Urine pH   (5.0-8.0)  


 


Ur Specific Gravity   (1.008-1.030)  


 


Urine Protein   (NEGATIVE)  mg/dL


 


Urine Glucose (UA)   (NEGATIVE)  mg/dL


 


Urine Ketones   (NEGATIVE)  mg/dL


 


Urine Occult Blood   (NEGATIVE)  


 


Urine Nitrite   (NEGATIVE)  


 


Urine Bilirubin   (NEGATIVE)  


 


Urine Urobilinogen   (0.2-1.0)  EU/dL


 


Ur Leukocyte Esterase   (NEGATIVE)  


 


Urine RBC   (0-5)  


 


Urine WBC   (0-5)  


 


Ur Epithelial Cells   


 


Amorphous Sediment   


 


Urine Bacteria   


 


Urine Mucus   














- Re-Assessments/Exams


Free Text/Narrative Re-Assessment/Exam: 





04/26/20 06:09


chest xray is neg--chronic changes.  urine looks clear, pt is dehydrated. His 

wbc was 6,700 mainly seghs. He has markedly elevated liver enzymes. He is very 

tender in the rt upper abdoman. He is very dehydrated. He was given zosyn and 

azatam. He will have a US of his GB area,  Pt was given tylenol 650. 


04/26/20 06:51





04/26/20 18:13


US revealed a thickened GB wall and evidence of a hot GB. 





Sepsis Event Note





- Evaluation


Sepsis Screening Result: Severe Sepsis Risk





- Focused Exam


Date Exam was Performed: 04/26/20


Time Exam was Performed: 18:13

## 2020-04-26 NOTE — CONS
DATE OF SERVICE:  04/26/2020

 

REFERRING PHYSICIAN:

 

CONSULTING PHYSICIAN:  Omari Cuevas MD

 

REASON FOR CONSULTATION:  Abdominal pain.

 

HISTORY OF PRESENT ILLNESS:  This is a 59-year-old male who presented to the emergency room

with right upper quadrant abdominal pain and a temperature of 101 degrees.  The patient is

not very communicated due to Alzheimer's and dementia.  The patient did undergo ultrasound,

which suggested cholelithiasis/cholecystitis.

 

PAST MEDICAL HISTORY:  As described above.  History of right hip pain, this is described as

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

 

SOCIAL HISTORY:  He is not a smoker.

 

FAMILY HISTORY:  Noncontributory.

 

REVIEW OF SYSTEMS:  Per chart as the patient cannot be interviewed.

CARDIOVASCULAR:  No reported history of myocardial infarction.

RESPIRATORY:  No current shortness of breath.

GASTROINTESTINAL:  As above.

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  However, the patient

has reportedly been catheterized before without difficulty.

NEUROLOGICAL:  As described above with Alzheimer's and dementia.

PSYCHIATRIC:  Also Alzheimer's and dementia.

HEMATOLOGICAL:  No reported past abnormal bleeding history.

 

PHYSICAL EXAMINATION:

VITAL SIGNS:  Blood pressure 93/46, pulse 121, respirations 18, 98% on room air.

HEENT:  Pupils are equal.

NECK:  Supple.

LUNGS:  Clear.

CARDIOVASCULAR:  Regular rhythm and rate.

ABDOMEN:  Pain with palpation of right upper quadrant.

EXTREMITIES:  He does move all four.

 

LABORATORY RESULTS:  Show white blood cell count of 6000, hemoglobin 13.  Basic metabolic

panel is essentially normal.  Bilirubin is normal.  Alkaline phosphatase is slightly

elevated.  AST and ALT are also elevated.  Urinalysis is nitrite negative.  Chest x-ray,

which I did review and the report is not back, does not show any gross abnormalities.

Ultrasound which has been resulted shows sludge and stones, thickened and irregular

gallbladder.

 

ASSESSMENT/PLAN:  The patient will undergo laparoscopic cholecystectomy.  This could be a

necrotic gallbladder or just cholecystitis/cholelithiasis.  Nonetheless, I discussed with

the family and their request and my opinion is to remove the gallbladder.  Therefore, I

discussed with them the risks, benefits, alternatives, and limitations including, but not

limited to, infection, bleeding, and injury to cystic duct, common bile duct injuries,

possibility of open surgery and other risks not listed here.  The patient understands these

risks along with the general risks of surgery and the patient's family understand these

risks and wished to proceed.  We also discussed that the patient does have some ongoing

comorbidities so things such as hematoma, seroma, infections are hard to delineate and the

risk is obviously high.

 

 

 

 

Omari Cuevas MD

DD:  04/26/2020 10:29:05

DT:  04/26/2020 11:42:24

Job #:  [id number]

## 2020-04-27 PROCEDURE — 3E033XZ INTRODUCTION OF VASOPRESSOR INTO PERIPHERAL VEIN, PERCUTANEOUS APPROACH: ICD-10-PCS | Performed by: SURGERY

## 2020-04-27 RX ADMIN — TAZOBACTAM SODIUM AND PIPERACILLIN SODIUM SCH MLS/HR: 375; 3 INJECTION, SOLUTION INTRAVENOUS at 19:08

## 2020-04-27 RX ADMIN — HYDROCODONE BITARTRATE AND ACETAMINOPHEN PRN TAB: 5; 325 TABLET ORAL at 20:28

## 2020-04-27 RX ADMIN — TAZOBACTAM SODIUM AND PIPERACILLIN SODIUM SCH MLS/HR: 375; 3 INJECTION, SOLUTION INTRAVENOUS at 02:09

## 2020-04-27 RX ADMIN — POTASSIUM CHLORIDE SCH MEQ: 750 CAPSULE, EXTENDED RELEASE ORAL at 08:22

## 2020-04-27 RX ADMIN — HYDROCODONE BITARTRATE AND ACETAMINOPHEN PRN TAB: 5; 325 TABLET ORAL at 08:38

## 2020-04-27 RX ADMIN — TAZOBACTAM SODIUM AND PIPERACILLIN SODIUM SCH MLS/HR: 375; 3 INJECTION, SOLUTION INTRAVENOUS at 14:17

## 2020-04-27 RX ADMIN — TAZOBACTAM SODIUM AND PIPERACILLIN SODIUM SCH MLS/HR: 375; 3 INJECTION, SOLUTION INTRAVENOUS at 07:47

## 2020-04-27 NOTE — PCM.CONSN
- General Info


Date of Service: 04/27/20


Subjective Update: 





Mr. Reddy is a 59-year-old gentleman who was admitted through the emergency 

department yesterday with fever and progressive weakness secondary to 

cholecystitis.  He did undergo laparoscopic cholecystectomy performed by Dr. Cuevas but was found to septic shock during the postoperative period with 

tachycardia and decreased blood pressures.  He was treated through the night 

with low-dose norepinephrine which stabilized blood pressure and he is doing 

somewhat better this morning although remains tachycardic.  Not tolerating much 

in the way of oral intake thus far.  He is unable to provide a meaningful 

history concerning recent symptoms or review of systems because of his 

underlying Down syndrome and associated dementia.





- Patient Data


Vitals - Most Recent: 


 Last Vital Signs











Temp  97.8 F   04/27/20 07:00


 


Pulse  114 H  04/27/20 08:42


 


Resp  12   04/27/20 08:42


 


BP  99/54 L  04/27/20 08:42


 


Pulse Ox  92 L  04/27/20 08:42











Weight - Most Recent: 160 lb


I&O - Last 24 Hours: 


 Intake & Output











 04/26/20 04/27/20 04/27/20





 22:59 06:59 14:59


 


Intake Total 1009 1637 


 


Output Total 415 305 


 


Balance 594 1332 











Lab Results Last 24 Hours: 


 Laboratory Results - last 24 hr











  04/26/20 04/26/20 04/27/20 Range/Units





  10:50 15:00 04:10 


 


WBC    18.0 H  (4.5-11.0)  K/uL


 


RBC    3.43 L  (4.30-5.90)  M/uL


 


Hgb    11.0 L D  (12.0-15.0)  g/dL


 


Hct    34.9 L  (40.0-54.0)  %


 


MCV    102 H  (80-98)  fL


 


MCH    32 H  (27-31)  pg


 


MCHC    32  (32-36)  %


 


Plt Count    149 L  (150-400)  K/uL


 


Add Manual Diff    Yes  


 


Neutrophils % (Manual)    70 H  (36-66)  %


 


Band Neutrophils %    20 H  (5-11)  %


 


Lymphocytes % (Manual)    5 L  (24-44)  %


 


Monocytes % (Manual)    5  (2-6)  %


 


Sodium     (140-148)  mmol/L


 


Potassium     (3.6-5.2)  mmol/L


 


Chloride     (100-108)  mmol/L


 


Carbon Dioxide     (21-32)  mmol/L


 


Anion Gap     (5.0-14.0)  mmol/L


 


BUN     (7-18)  mg/dL


 


Creatinine     (0.8-1.3)  mg/dL


 


Est Cr Clr Drug Dosing     mL/min


 


Estimated GFR (MDRD)     (>60)  


 


Glucose     ()  mg/dL


 


Lactic Acid  4.8 H  3.0 H   (0.4-2.0)  mmol/L


 


Calcium     (8.5-10.1)  mg/dL


 


Total Bilirubin     (0.2-1.0)  mg/dL


 


AST     (15-37)  U/L


 


ALT     (12-78)  U/L


 


Alkaline Phosphatase     ()  U/L


 


Total Protein     (6.4-8.2)  g/dL


 


Albumin     (3.4-5.0)  g/dL


 


Globulin     (2.3-3.5)  g/dL


 


Albumin/Globulin Ratio     (1.2-2.2)  














  04/27/20 Range/Units





  04:10 


 


WBC   (4.5-11.0)  K/uL


 


RBC   (4.30-5.90)  M/uL


 


Hgb   (12.0-15.0)  g/dL


 


Hct   (40.0-54.0)  %


 


MCV   (80-98)  fL


 


MCH   (27-31)  pg


 


MCHC   (32-36)  %


 


Plt Count   (150-400)  K/uL


 


Add Manual Diff   


 


Neutrophils % (Manual)   (36-66)  %


 


Band Neutrophils %   (5-11)  %


 


Lymphocytes % (Manual)   (24-44)  %


 


Monocytes % (Manual)   (2-6)  %


 


Sodium  143  (140-148)  mmol/L


 


Potassium  4.1  (3.6-5.2)  mmol/L


 


Chloride  110 H  (100-108)  mmol/L


 


Carbon Dioxide  25  (21-32)  mmol/L


 


Anion Gap  12.1  (5.0-14.0)  mmol/L


 


BUN  15  (7-18)  mg/dL


 


Creatinine  0.9  (0.8-1.3)  mg/dL


 


Est Cr Clr Drug Dosing  85.50  mL/min


 


Estimated GFR (MDRD)  > 60  (>60)  


 


Glucose  136 H  ()  mg/dL


 


Lactic Acid   (0.4-2.0)  mmol/L


 


Calcium  7.2 L  (8.5-10.1)  mg/dL


 


Total Bilirubin  0.4  (0.2-1.0)  mg/dL


 


AST  101 H  (15-37)  U/L


 


ALT  162 H  (12-78)  U/L


 


Alkaline Phosphatase  137 H  ()  U/L


 


Total Protein  5.1 L  (6.4-8.2)  g/dL


 


Albumin  1.7 L  (3.4-5.0)  g/dL


 


Globulin  3.4  (2.3-3.5)  g/dL


 


Albumin/Globulin Ratio  0.5 L  (1.2-2.2)  











Roberto Results Last 24 Hours: 


 Microbiology











 04/26/20 05:55 Aerobic Blood Culture - Preliminary





 Blood - Venous - Lab Draw Anaerobic Blood Culture - Preliminary


 


 04/26/20 05:50 Aerobic Blood Culture - Preliminary





 Blood - Venous Anaerobic Blood Culture - Preliminary


 


 04/26/20 09:15 Gram Stain - Final





 Abdominal Fluid - Aspirate Wound Culture - Preliminary





    NO GROWTH AFTER 1 DAY





 Anaerobic Culture - Preliminary





    NO GROWTH AFTER 1 DAY


 


 04/26/20 05:40 Urine Culture - Preliminary





 Urine, Bladder    NO GROWTH AFTER 1 DAY











Med Orders - Current: 


 Current Medications





Hydrocodone Bitart/Acetaminophen (Norco 325-5 Mg)  1 tab PO Q4H PRN


   PRN Reason: Pain (moderate 4-6)


   Last Admin: 04/27/20 08:38 Dose:  1 tab


Benzocaine/Menthol (Cepacol Sore Throat)  1 lozenge MUCMEM Q4H PRN


   PRN Reason: Sore Throat


Ropivacaine 36 ml/Dexamethasone 8 mg/Epinephrine HCl 0.4 mg/ Sodium Chloride 

41.6 ml  0 ml NERVRT ASDIRECTED [facility]


   Last Admin: 04/26/20 09:35 Dose:  80 syringe


Docusate Sodium (Colace)  100 mg PO BID PRN


   PRN Reason: Constipation


Donepezil HCl (Aricept)  10 mg PO DAILY [facility]


   Last Admin: 04/27/20 08:20 Dose:  10 mg


Enoxaparin Sodium (Lovenox)  40 mg SUBCUT DAILY [facility]


   Last Admin: 04/27/20 08:21 Dose:  40 mg


Hydrocortisone (Hydrocortisone 1% Crm)  0 gm TOP BID PRN


   PRN Reason: Rash


Hydroxyzine HCl (Vistaril)  100 mg IM Q4H PRN


   PRN Reason: Breakthrough Pain


Piperacillin/Tazobactam/ (Dextrose 3.375 gm/ Premix)  50 mls @ 100 mls/hr IV 

Q6H [facility]


   Last Admin: 04/27/20 07:47 Dose:  100 mls/hr


Potassium Cl/Dextrose/Lact Ringer's (D5 Lr With 20 Meq Kcl)  1,000 mls @ 125 mls

/hr IV ASDIRECTED [facility]


   Last Admin: 04/26/20 21:54 Dose:  125 mls/hr


Vancomycin HCl 1 gm/ Sodium (Chloride)  250 mls @ 166.667 mls/hr IV Q12H [facility]


   Last Admin: 04/26/20 23:46 Dose:  166.667 mls/hr


Norepinephrine Bitartrate 4 mg (/ Dextrose/Water)  250 mls @ 7.5 mls/hr IV 

TITRATE [facility]; Protocol


   Last Titration: 04/27/20 06:48 Dose:  0 mcg/min, 0 mls/hr


Levetiracetam (Keppra)  750 mg PO BID [facility]


   Last Admin: 04/27/20 08:21 Dose:  750 mg


Ondansetron HCl (Zofran Odt)  4 mg PO Q4H PRN


   PRN Reason: Nausea/Vomiting


Ondansetron HCl (Zofran)  4 mg IVPUSH Q4H PRN


   PRN Reason: Nausea/Vomiting


Oxcarbazepine (Trileptal)  150 mg PO BID [facility]


   Last Admin: 04/27/20 08:23 Dose:  150 mg


Pantoprazole Sodium (Protonix***)  40 mg PO ACBREAKFAST [facility]


   Last Admin: 04/27/20 08:20 Dose:  40 mg


Potassium Chloride (Potassium Chloride)  10 meq PO DAILY [facility]


   Last Admin: 04/27/20 08:22 Dose:  10 meq


Quetiapine Fumarate (Seroquel)  50 mg PO TID [facility]


   Last Admin: 04/27/20 08:22 Dose:  50 mg


Sertraline HCl (Zoloft)  50 mg PO DAILY [facility]


   Last Admin: 04/27/20 08:24 Dose:  50 mg


Zolpidem Tartrate (Ambien)  5 mg PO BEDTIME PRN


   PRN Reason: Sleep





Discontinued Medications





Acetaminophen (Tylenol)  650 mg RECTAL NOW ONE


   Stop: 04/26/20 05:59


   Last Admin: 04/26/20 06:03 Dose:  650 mg


Bupivacaine HCl (Marcaine 0.5%) Confirm Administered Dose 50 ml .ROUTE .[facility]-MED 

ONE


   Stop: 04/26/20 08:35


   Last Admin: 04/26/20 09:29 Dose:  20 ml


Dexamethasone (Dexamethasone) Confirm Administered Dose 4 mg .ROUTE .[facility] ONE


   Stop: 04/26/20 08:42


Fentanyl (Sublimaze) Confirm Administered Dose 250 mcg .ROUTE .[facility] ONE


   Stop: 04/26/20 08:42


Glycopyrrolate (Robinul) Confirm Administered Dose 1 mg .ROUTE .[facility] ONE


   Stop: 04/26/20 08:42


Sodium Chloride (Normal Saline)  [facility],000 mls [facility]


   Last Admin: 04/26/20 05:40 Dose:  999 mls/hr


Sodium Chloride (Normal Saline)  1,000 mls @ 999 mls/hr IV [facility]


   Last Admin: 04/26/20 06:30 Dose:  999 mls/hr


Piperacillin Sod/Tazobactam (Sod 4.5 gm/ Sodium Chloride)  100 mls @ 100 mls/hr 

IV ONETIME ONE


   Stop: 04/26/20 07:16


   Last Admin: 04/26/20 06:35 Dose:  100 mls/hr


Aztreonam 1 gm/ Sodium (Chloride)  50 mls @ 100 mls/hr IV ONETIME ONE


   Stop: 04/26/20 07:01


   Last Admin: 04/26/20 06:53 Dose:  100 mls/hr


Sodium Chloride (Normal Saline) Confirm Administered Dose 100 mls @ as directed 

.ROUTE .[facility] ONE


   Stop: 04/26/20 06:33


   Last Admin: 04/26/20 06:40 Dose:  Not Given


Sodium Chloride (Normal Saline) Confirm Administered Dose 100 mls @ as directed 

.ROUTE .[facility] ONE


   Stop: 04/26/20 06:34


   Last Admin: 04/26/20 06:40 Dose:  Not Given


Sodium Chloride (Normal Saline)  1,000 mls @ 500 mls/hr IV [facility]


   Last Admin: 04/26/20 07:36 Dose:  500 mls/hr


Lactated Ringer's (Ringers, Lactated) Confirm Administered Dose 1,000 mls @ as 

directed .ROUTE .[facility] ONE


   Stop: 04/26/20 09:01


Sodium Chloride (Normal Saline) Confirm Administered Dose 10 mls @ as directed 

.ROUTE .[facility] ONE


   Stop: 04/26/20 09:05


Lactated Ringer's (Ringers, Lactated) Confirm Administered Dose 1,000 mls @ as 

directed .ROUTE .[facility] ONE


   Stop: 04/26/20 09:08


Lactated Ringer's (Ringers, Lactated) Confirm Administered Dose 1,000 mls @ as 

[facility]


   Stop: 04/26/20 09:38


Lactated Ringer's (Ringers, Lactated)  1,000 ml IRR .[facility] ONE


   Stop: 04/26/20 09:16


   Last Admin: 04/26/20 09:15 Dose:  1,000 ml


Lidocaine/Epinephrine (Xylocaine 1% With Epinephrine 1:100,000) Confirm 

Administered Dose 50 ml .ROUTE .[facility] ONE


   Stop: 04/26/20 08:35


   Last Admin: 04/26/20 09:29 Dose:  20 ml


Neostigmine Methylsulfate (Neostigmine) Confirm Administered Dose 5 mg .ROUTE 

.[facility]-MED ONE


   Stop: 04/26/20 08:42


Ondansetron HCl (Zofran) Confirm Administered Dose 4 mg .ROUTE .[facility] ONE


   Stop: 04/26/20 08:42


Phenylephrine HCl (Niraj-Synephrine) Confirm Administered Dose 10 mg .ROUTE .[facility]-

MED ONE


   Stop: 04/26/20 09:05


Piperacillin Sod/Tazobactam Sod (Zosyn) Confirm Administered Dose 4.5 gm .ROUTE 

.[facility] ONE


   Stop: 04/26/20 06:31


   Last Admin: 04/26/20 06:39 Dose:  Not Given


Propofol (Diprivan  20 Ml) Confirm Administered Dose 200 mg .ROUTE .[facility] ONE


   Stop: 04/26/20 08:42


Rocuronium Bromide (Zemuron) Confirm Administered Dose 50 mg .ROUTE .[facility] ONE


   Stop: 04/26/20 08:42


Succinylcholine Chloride (Quelicin) Confirm Administered Dose 200 mg .ROUTE .[facility]

-MED ONE


   Stop: 04/26/20 08:42











- Exam


General: Alert, Cooperative, Moderate Distress


Lungs: Clear to Auscultation, Normal Respiratory Effort


Cardiovascular: Regular Rate, Regular Rhythm, No Murmurs


GI/Abdominal Exam: Soft, No Organomegaly, Tender.  No: Distended, Guarding, 

Rigid, Rebound


Extremities: Non-Tender, No Pedal Edema





Sepsis Event Note





- Evaluation


Sepsis Screening Result: Severe Sepsis Risk





- Focused Exam


Vital Signs: 


 Vital Signs











  Temp Pulse Resp BP BP Pulse Ox


 


 04/27/20 08:42   114 H  12   99/54 L  92 L


 


 04/27/20 08:00   109 H  19   99/54 L  90 L


 


 04/27/20 07:00  97.8 F  105 H  17  106/62  106/62  95


 


 04/27/20 06:00   107 H  19   101/63  92 L


 


 04/27/20 05:27       94 L


 


 04/27/20 05:00   101 H  21 H   106/62  93 L


 


 04/27/20 04:00  97.8 F  109 H  18   102/59 L  96


 


 04/27/20 03:00   100  17   101/56 L  96


 


 04/27/20 02:00   105 H  19   95/55 L  93 L


 


 04/27/20 01:00   114 H  20   95/55 L  97


 


 04/27/20 00:00  97.3 F  102 H  18   89/50 L  94 L


 


 04/26/20 23:00   112 H  19   99/58 L  95


 


 04/26/20 22:00   117 H  21 H   99/54 L  96











Date Exam was Performed: 04/27/20


Time Exam was Performed: 09:03





Consult PN Assessment/Plan


Procedures: 


Procedures





OFFICE/OUTPATIENT VISIT NEW (09/03/19)


URINALYSIS AUTO W/O SCOPE (10/25/19)


X-RAY EXAM HIP UNI 2-3 VIEWS (02/11/20)








Problem List Initiated/Reviewed/Updated: Yes


My Orders Last 24 Hours: 


My Active Orders





04/28/20 05:00


CBC WITH AUTO DIFF [HEME] Timed 


COMPREHENSIVE METABOLIC PN,CMP [CHEM] Timed 


MAGNESIUM [CHEM] Timed 











Plan: 





ASSESSMENT AND PLAN  





Acute cholecystitis with septic shock-received vigorous fluid replacement 

yesterday and despite that remained hypotensive so he was placed on IV 

norepinephrine during the night.  Blood pressure is stabilized and he is 

currently off of the IV norepinephrine.  Blood cultures are growing gram-

negative rods, final ID and sensitivities pending.


-To new vancomycin, Zosyn, and aztreonam pending culture results


-Continue IV fluids


-Follow-up cultures





Hypokalemia-will be supplemented.





Down syndrome-significant disability at this time.  Difficulty with 

communication.


-Continue home medications





Maintenance issues - 


- DVT prophylaxis -mechanical today, enoxaparin started tomorrow


- Nutrition -advance per surgical instructions


- Maldonado catheter -placed in the emergency room for strict intake and output 

monitoring in a critically ill patient





Admission justification -this patient will be admitted for inpatient services 

and is medically appropriate meeting medical necessity for inpatient admission 

as outlined in my documentation.  I reasonably expect the patient will require 

inpatient services that span a period time over 2 midnights. I reasonably 

expect this patient to be discharged or transferred within 96 hours after 

admission to the [facility].

## 2020-04-27 NOTE — OR
DATE OF PROCEDURE:  04/26/2020

 

SURGEON:  Omari Cuevas MD

 

PROCEDURE:

1. Laparoscopic cholecystectomy (78062).

2. Drainage of intraabdominal peritoneal fluid collection (11405).

 

PREOPERATIVE DIAGNOSES:

1. Cholelithiasis.

2. Cholecystitis.

 

POSTOPERATIVE DIAGNOSES:

1. Cholelithiasis.

2. Cholecystitis.

 

ANESTHESIA:  General anesthetic.



FINDINGS:

1. Very thick gallbladder with significant amount of inflammation and omental 
wrapping.

2. Peritonitis due to a fluid collection in the right upper quadrant (suctioned
, cultured,

    and irrigated).

 

COMPLICATIONS:  None.

 

ASSISTANT:  None.

 

INDICATIONS:  A 59-year-old noncommunicative male with right upper quadrant 
abdominal pain

on palpation.  Imaging showing gallbladder inflammation requiring laparoscopic

cholecystectomy.

 

RISK:  The family were explained risks, benefits, alternatives, and limitations 
including,

but not limited to infection, bleeding, cystic duct injury, common bile duct 
injury, cystic

duct leaks, hematoma, seroma, the possibility of sepsis and its sequelae, and 
other risks

not listed here.  The patient's family understood these risks and wished to 
proceed.

 

PROCEDURE IN DETAIL:  The patient was placed in supine position.  A 
supraumbilical

curvilinear incision was made.  A Veress needle was used to enter the abdomen 
without

abnormality.  A drop test was performed without abnormality.  The abdomen was 
subsequently

insufflated.  The Optiview trocar was entered and no evidence of enterotomy or 
injury was

noted.  The gallbladder was retracted cephalad.  The infundibulum was retracted

inferolaterally.  The gallbladder itself was very densely adhesed and very 
inflamed.  The

blunt dissection continued for approximately another 40 minutes.  A single 
pulsatile

structure was noted entering the gallbladder, and a single nonpulsatile 
structure was noted

entering the gallbladder.  These 2 were clipped x3 and subsequently transected.

 

The remaining one-third of the gallbladder was removed off the gallbladder bed 
without

difficulty.  Minimal bleeding was on the gallbladder bed, although there was a 
large amount

of edema.  The pressure was dropped to 6 within the abdomen and held, and no 
abnormal

bleeding was noted.  The abdomen was then thoroughly irrigated.

 

Prior to removal of the gallbladder, during the initial entry into the abdomen, 
a fluid

collection was noted in the right upper abdomen.  This was tan in nature and 
concerning for

infection.  This was causing peritonitis in that area and not related to 
appendicitis.  This

was cultured and then suctioned out and irrigated.

 

The anterior plane was then inspected for injury, and none was noted.  The air 
was removed.

The wounds were closed with 3-0 Vicryl and 4-0 Vicryl in interrupted running 
fashion and

Dermabond was applied.  The patient tolerated the procedure well.

 

 

 

 

Omari Cuevas MD

DD:  04/26/2020 10:39:26

DT:  04/26/2020 11:14:53

Job #:  [id number]

MTDD

## 2020-04-27 NOTE — CR
CHEST: Portable 4/26/2020 at 6:06 AM

 

CLINICAL HISTORY:Fever

 

COMPARISON:None

 

FINDINGS:  The heart size, pulmonary vascularity and hilar structures are

normal. There is some minimal patchy density in the left the infrahilar region.

Some of this may be chronic.

 

IMPRESSION: Minimal patchy left lower lobe density. An early infiltrate is not

excluded. If clinically relevant, 2 view chest is recommended.

## 2020-04-27 NOTE — PN
DATE OF SERVICE:  04/27/2020

 

SUBJECTIVE:  The patient is unchanged from yesterday.  The subjective interpretation is

difficult to appreciate in this patient due to his Down's and Alzheimer's.

 

OBJECTIVE:  VITAL SIGNS:  The patient remains with the same hypotensive standard.

SKIN:  His incision is healing well.

 

ASSESSMENT:  Status post laparoscopic cholecystectomy.

 

PLAN:

1. Infectious Disease:  The patient remains on broad-spectrum antibiotics.  His blood

    cultures all grew gram-negative rods.  Hospitalist service continues to manage

    infectious etiology.

2. Diet:  He is on general diet and awaiting further GI activity.

3. General Disposition:  No specific changes.

4. Fluid, Electrolytes:  Per hospitalist service.

5. GI:  Bilirubin was normal.  LFTs have improved.

 

 

 

 

Omari Cuevas MD

DD:  04/27/2020 11:04:41

DT:  04/27/2020 11:20:49

Job #:  [id number]

## 2020-04-27 NOTE — OR
DATE OF PROCEDURE:  04/26/2020

 

SURGEON:  Omari Cuevas MD

 

PROCEDURE:  Transversus abdominis plane block, bilaterally.

 

COMPLICATIONS:  None.

 

ASSISTANT:  None.

 

RISKS:  Risks, benefits, alternatives, and limitations including but not limited to

infection, bleeding, and injury to abdominal structures were explained.  The patient wished

to proceed.

 

PROCEDURE IN DETAIL:  The patient was placed in supine position.  The right side was

addressed first.  Using 11 megahertz ultrasound probe, the transversus plane was identified,

and 80% of solution was injected.  This was then performed in same manner, same fashion,

same technique, and in the same sequence using the same equipment on the other side.

 

The patient tolerated the procedure well.

 

 

 

 

Omari Cuevas MD

DD:  04/26/2020 10:40:37

DT:  04/26/2020 11:24:05

Job #:  [id number]

## 2020-04-28 RX ADMIN — SODIUM CHLORIDE SCH MLS/HR: 9 INJECTION, SOLUTION INTRAVENOUS at 13:59

## 2020-04-28 RX ADMIN — MAGNESIUM SULFATE IN WATER SCH MLS/HR: 40 INJECTION, SOLUTION INTRAVENOUS at 16:02

## 2020-04-28 RX ADMIN — PANTOPRAZOLE SODIUM SCH MG: 40 GRANULE, DELAYED RELEASE ORAL at 08:51

## 2020-04-28 RX ADMIN — POTASSIUM CHLORIDE SCH MEQ: 750 CAPSULE, EXTENDED RELEASE ORAL at 08:53

## 2020-04-28 RX ADMIN — TAZOBACTAM SODIUM AND PIPERACILLIN SODIUM SCH MLS/HR: 375; 3 INJECTION, SOLUTION INTRAVENOUS at 02:19

## 2020-04-28 RX ADMIN — Medication SCH CAP: at 13:59

## 2020-04-28 RX ADMIN — MAGNESIUM SULFATE IN WATER SCH MLS/HR: 40 INJECTION, SOLUTION INTRAVENOUS at 10:10

## 2020-04-28 RX ADMIN — LEVOFLOXACIN SCH MLS/HR: 750 INJECTION, SOLUTION INTRAVENOUS at 16:59

## 2020-04-28 RX ADMIN — Medication SCH: at 14:11

## 2020-04-28 RX ADMIN — HYDROCODONE BITARTRATE AND ACETAMINOPHEN PRN TAB: 5; 325 TABLET ORAL at 02:15

## 2020-04-28 RX ADMIN — Medication SCH: at 20:39

## 2020-04-28 RX ADMIN — SODIUM CHLORIDE SCH MLS/HR: 9 INJECTION, SOLUTION INTRAVENOUS at 16:57

## 2020-04-28 RX ADMIN — TAZOBACTAM SODIUM AND PIPERACILLIN SODIUM SCH MLS/HR: 375; 3 INJECTION, SOLUTION INTRAVENOUS at 07:56

## 2020-04-28 RX ADMIN — TAZOBACTAM SODIUM AND PIPERACILLIN SODIUM SCH MLS/HR: 375; 3 INJECTION, SOLUTION INTRAVENOUS at 19:24

## 2020-04-28 NOTE — PCM.PN
- General Info


Date of Service: 04/28/20


Subjective Update: 





Mr. Reddy has continued to intermittently require use of norepinephrine to 

maintain adequate blood pressure and he continues to experience persistent 

sinus tachycardia.  He is remained afebrile and respiratory status is stable.  

Blood cultures are growing out pansensitive E. coli.  He remains fairly weak 

and lethargic, does arouse to physical stimulation.  He is unable to provide a 

meaningful history concerning symptoms and review of systems because of current 

lethargy as well as underlying Down syndrome with cognitive impairment.





- Patient Data


Vitals - Most Recent: 


 Last Vital Signs











Temp  98.9 F   04/28/20 10:00


 


Pulse  124 H  04/28/20 11:00


 


Resp  18   04/28/20 11:00


 


BP  118/65   04/28/20 11:00


 


Pulse Ox  94 L  04/28/20 11:00











Weight - Most Recent: 160 lb


I&O - Last 24 Hours: 


 Intake & Output











 04/27/20 04/28/20 04/28/20





 22:59 06:59 14:59


 


Intake Total 1959 1122 100


 


Output Total 422 216 120


 


Balance 1537 906 -20











Lab Results Last 24 Hours: 


 Laboratory Results - last 24 hr











  04/28/20 04/28/20 Range/Units





  05:30 05:30 


 


WBC  15.5 H   (4.5-11.0)  K/uL


 


RBC  3.32 L   (4.30-5.90)  M/uL


 


Hgb  10.8 L   (12.0-15.0)  g/dL


 


Hct  34.0 L   (40.0-54.0)  %


 


MCV  102 H   (80-98)  fL


 


MCH  33 H   (27-31)  pg


 


MCHC  32   (32-36)  %


 


Plt Count  145 L   (150-400)  K/uL


 


Add Manual Diff  Yes   


 


Neutrophils % (Manual)  81 H   (36-66)  %


 


Band Neutrophils %  13 H   (5-11)  %


 


Lymphocytes % (Manual)  3 L   (24-44)  %


 


Monocytes % (Manual)  3   (2-6)  %


 


Sodium   143  (140-148)  mmol/L


 


Potassium   3.7  (3.6-5.2)  mmol/L


 


Chloride   110 H  (100-108)  mmol/L


 


Carbon Dioxide   24  (21-32)  mmol/L


 


Anion Gap   12.7  (5.0-14.0)  mmol/L


 


BUN   12  (7-18)  mg/dL


 


Creatinine   0.8  (0.8-1.3)  mg/dL


 


Est Cr Clr Drug Dosing   96.19  mL/min


 


Estimated GFR (MDRD)   > 60  (>60)  


 


Glucose   97  ()  mg/dL


 


Calcium   7.4 L  (8.5-10.1)  mg/dL


 


Magnesium   1.6 L  (1.8-2.4)  mg/dL


 


Total Bilirubin   0.5  (0.2-1.0)  mg/dL


 


AST   68 H  (15-37)  U/L


 


ALT   139 H  (12-78)  U/L


 


Alkaline Phosphatase   153 H  ()  U/L


 


Total Protein   5.2 L  (6.4-8.2)  g/dL


 


Albumin   1.6 L  (3.4-5.0)  g/dL


 


Globulin   3.6 H  (2.3-3.5)  g/dL


 


Albumin/Globulin Ratio   0.4 L  (1.2-2.2)  











Roberto Results Last 24 Hours: 


 Microbiology











 04/26/20 09:15 Gram Stain - Final





 Abdominal Fluid - Aspirate Wound Culture - Preliminary





    NO GROWTH AFTER 2 DAYS





 Anaerobic Culture - Preliminary





    NO GROWTH AFTER 2 DAYS


 


 04/26/20 05:40 Urine Culture - Final





 Urine, Bladder    NO GROWTH AFTER 2 DAYS


 


 04/26/20 05:55 Aerobic Blood Culture - Final





 Blood - Venous - Lab Draw    Escherichia Coli





 Anaerobic Blood Culture - Final





    Escherichia Coli


 


 04/26/20 05:50 Aerobic Blood Culture - Final





 Blood - Venous    Escherichia Coli





 Anaerobic Blood Culture - Final





    Escherichia Coli











Med Orders - Current: 


 Current Medications





Hydrocodone Bitart/Acetaminophen (Norco 325-5 Mg)  1 tab PO Q4H PRN


   PRN Reason: Pain (moderate 4-6)


   Last Admin: 04/28/20 02:15 Dose:  1 tab


Benzocaine/Menthol (Cepacol Sore Throat)  1 lozenge MUCMEM Q4H PRN


   PRN Reason: Sore Throat


Docusate Sodium (Colace)  100 mg PO BID PRN


   PRN Reason: Constipation


Donepezil HCl (Aricept)  10 mg PO DAILY [facility]


   Last Admin: 04/28/20 08:54 Dose:  10 mg


Enoxaparin Sodium (Lovenox)  40 mg SUBCUT DAILY [facility]


   Last Admin: 04/28/20 08:54 Dose:  40 mg


Hydrocortisone (Hydrocortisone 1% Crm)  0 gm TOP BID PRN


   PRN Reason: Rash


Hydroxyzine HCl (Vistaril)  100 mg IM Q4H PRN


   PRN Reason: Breakthrough Pain


Norepinephrine Bitartrate 4 mg (/ Dextrose/Water)  250 mls @ 7.5 mls/hr IV 

TITRATE [facility]; Protocol


   Last Titration: 04/28/20 02:00 Dose:  0 mcg/min, 0 mls/hr


Magnesium Sulfate 2 gm/ Premix  50 mls @ 25 mls/hr IV Q6H [facility]


   Stop: 04/28/20 17:59


   Last Admin: 04/28/20 10:10 Dose:  25 mls/hr


Dextrose/Lactated Ringer's (Dextrose 5%-Lactated Ringers)  1,000 mls @ 75 mls/

hr IV ASDIRECTED [facility]


   Last Admin: 04/28/20 11:10 Dose:  75 mls/hr


Potassium Chloride 40 meq/ (Premix)  100 mls @ 25 mls/hr IV ONETIME ONE


   Stop: 04/28/20 16:48


Ceftazidime 1 gm/ Sodium (Chloride)  50 mls @ 100 mls/hr IV Q8H [facility]


Lactobacillus Rhamnosus (Culturelle)  1 cap PO BID [facility]


Levetiracetam (Keppra)  750 mg PO BID [facility]


   Last Admin: 04/28/20 08:53 Dose:  750 mg


Magnesium Oxide (Magnesium Oxide)  400 mg PO BID [facility]


Ondansetron HCl (Zofran Odt)  4 mg PO Q4H PRN


   PRN Reason: Nausea/Vomiting


Ondansetron HCl (Zofran)  4 mg IVPUSH Q4H PRN


   PRN Reason: Nausea/Vomiting


Oxcarbazepine (Trileptal)  150 mg PO BID [facility]


   Last Admin: 04/28/20 08:58 Dose:  150 mg


Pantoprazole Sodium (Protonix Granules***)  40 mg PO DAILY@0730 [facility]


   Last Admin: 04/28/20 08:51 Dose:  40 mg


Potassium Chloride (Potassium Chloride)  10 meq PO DAILY [facility]


   Last Admin: 04/28/20 08:53 Dose:  10 meq


Quetiapine Fumarate (Seroquel)  50 mg PO TID [facility]


   Last Admin: 04/28/20 08:51 Dose:  50 mg


Sertraline HCl (Zoloft)  50 mg PO DAILY [facility]


   Last Admin: 04/28/20 08:54 Dose:  50 mg


Zolpidem Tartrate (Ambien)  5 mg PO BEDTIME PRN


   PRN Reason: Sleep





Discontinued Medications





Acetaminophen (Tylenol)  650 mg RECTAL NOW ONE


   Stop: 04/26/20 05:59


   Last Admin: 04/26/20 06:03 Dose:  650 mg


Bupivacaine HCl (Marcaine 0.5%) Confirm Administered Dose 50 ml .ROUTE .STK-MED 

ONE


   Stop: 04/26/20 08:35


   Last Admin: 04/26/20 09:29 Dose:  20 ml


Ropivacaine 36 ml/Dexamethasone 8 mg/Epinephrine HCl 0.4 mg/ Sodium Chloride 

41.6 ml  [facility]


   Last Admin: 04/26/20 09:35 Dose:  80 syringe


Dexamethasone (Dexamethasone) Confirm Administered Dose 4 mg .ROUTE .STK-MED ONE


   Stop: 04/26/20 08:42


Fentanyl (Sublimaze) Confirm Administered Dose 250 mcg .ROUTE .STK-MED ONE


   Stop: 04/26/20 08:42


Glycopyrrolate (Robinul) Confirm Administered Dose 1 mg .ROUTE .STK-MED ONE


   Stop: 04/26/20 08:42


Sodium Chloride (Normal Saline)  1,000 mls @ 999 mls/hr IV [facility]


   Last Admin: 04/26/20 05:40 Dose:  999 mls/hr


Sodium Chloride (Normal Saline)  1,000 mls @ 999 mls/hr IV [facility]


   Last Admin: 04/26/20 06:30 Dose:  999 mls/hr


Piperacillin Sod/Tazobactam (Sod 4.5 gm/ Sodium Chloride)  100 mls @ 100 mls/hr 

IV ONETIME ONE


   Stop: 04/26/20 07:16


   Last Admin: 04/26/20 06:35 Dose:  100 mls/hr


Aztreonam 1 gm/ Sodium (Chloride)  50 mls @ 100 mls/hr IV ONETIME ONE


   Stop: 04/26/20 07:01


   Last Admin: 04/26/20 06:53 Dose:  100 mls/hr


Sodium Chloride (Normal Saline) Confirm Administered Dose 100 mls @ as directed 

.ROUTE .STK-MED ONE


   Stop: 04/26/20 06:33


   Last Admin: 04/26/20 06:40 Dose:  Not Given


Sodium Chloride (Normal Saline) Confirm Administered Dose 100 mls @ as directed 

.ROUTE .STK-MED ONE


   Stop: 04/26/20 06:34


   Last Admin: 04/26/20 06:40 Dose:  Not Given


Sodium Chloride (Normal Saline)  1,000 mls @ 500 mls/hr IV ASDIRECTED [facility]


   Last Admin: 04/26/20 07:36 Dose:  500 mls/hr


Lactated Ringer's (Ringers, Lactated) Confirm Administered Dose 1,000 mls @ as 

directed .ROUTE .STK-MED ONE


   Stop: 04/26/20 09:01


Sodium Chloride (Normal Saline) Confirm Administered Dose 10 mls @ as directed 

.ROUTE .STK-MED ONE


   Stop: 04/26/20 09:05


Lactated Ringer's (Ringers, Lactated) Confirm Administered Dose 1,000 mls @ as 

directed .ROUTE .STK-MED ONE


   Stop: 04/26/20 09:08


Lactated Ringer's (Ringers, Lactated) Confirm Administered Dose 1,000 mls @ as 

directed .ROUTE .STK-MED ONE


   Stop: 04/26/20 09:38


Piperacillin/Tazobactam/ (Dextrose 3.375 gm/ Premix)  50 mls @ 100 mls/hr IV 

Q6H [facility]


   Last Admin: 04/28/20 07:56 Dose:  100 mls/hr


Potassium Cl/Dextrose/Lact Ringer's (D5 Lr With 20 Meq Kcl)  1,000 mls @ 125 mls

/hr IV ASDIRECTED [facility]


   Last Admin: 04/26/20 21:54 Dose:  125 mls/hr


Vancomycin HCl 1 gm/ Sodium (Chloride)  250 mls @ 166.667 mls/hr IV Q12H [facility]


   Last Admin: 04/28/20 00:19 Dose:  166.667 mls/hr


Potassium Cl/Dextrose/Lact Ringer's (D5 Lr With 20 Meq Kcl)  1,000 mls @ 75 mls/

hr IV ASDIRECTED [facility]


   Last Admin: 04/27/20 18:42 Dose:  75 mls/hr


Lactated Ringer's (Ringers, Lactated)  1,000 ml IRR .STK-MED ONE


   Stop: 04/26/20 09:16


   Last Admin: 04/26/20 09:15 Dose:  1,000 ml


Lidocaine/Epinephrine (Xylocaine 1% With Epinephrine 1:100,000) Confirm 

Administered Dose 50 ml .ROUTE .STK-MED ONE


   Stop: 04/26/20 08:35


   Last Admin: 04/26/20 09:29 Dose:  20 ml


Neostigmine Methylsulfate (Neostigmine) Confirm Administered Dose 5 mg .ROUTE 

.STK-MED ONE


   Stop: 04/26/20 08:42


Ondansetron HCl (Zofran) Confirm Administered Dose 4 mg .ROUTE .STK-MED ONE


   Stop: 04/26/20 08:42


Pantoprazole Sodium (Protonix***)  40 mg PO ACBREAKFAST [facility]ASYL


   Last Admin: 04/27/20 08:20 Dose:  40 mg


Phenylephrine HCl (Niraj-Synephrine) Confirm Administered Dose 10 mg .ROUTE .STK-

MED ONE


   Stop: 04/26/20 09:05


Piperacillin Sod/Tazobactam Sod (Zosyn) Confirm Administered Dose 4.5 gm .ROUTE 

.STK-MED ONE


   Stop: 04/26/20 06:31


   Last Admin: 04/26/20 06:39 Dose:  Not Given


Propofol (Diprivan  20 Ml) Confirm Administered Dose 200 mg .ROUTE .STK-MED ONE


   Stop: 04/26/20 08:42


Rocuronium Bromide (Zemuron) Confirm Administered Dose 50 mg .ROUTE .STK-MED ONE


   Stop: 04/26/20 08:42


Succinylcholine Chloride (Quelicin) Confirm Administered Dose 200 mg .ROUTE .STK

-MED ONE


   Stop: 04/26/20 08:42











- Exam


Quality Assessment: DVT Prophylaxis


General: Mild Distress, Lethargic


Lungs: Clear to Auscultation, Normal Respiratory Effort


Cardiovascular: Regular Rhythm, No Murmurs, Tachycardia


GI/Abdominal Exam: Soft, No Organomegaly, Tender.  No: Distended, Guarding, 

Rigid, Rebound


Extremities: Non-Tender, No Pedal Edema


Skin: Warm, Dry





Sepsis Event Note





- Evaluation


Sepsis Screening Result: Severe Sepsis Risk





- Focused Exam


Vital Signs: 


 Vital Signs











  Temp Pulse Resp BP Pulse Ox


 


 04/28/20 11:00   124 H  18  118/65  94 L


 


 04/28/20 10:00  98.9 F  130 H  16  115/57 L  96


 


 04/28/20 09:00  99 F  125 H  21 H  105/61  97


 


 04/28/20 08:00   122 H  20  98/60 


 


 04/28/20 07:00  97.8 F  122 H  18  113/53 L  20 L


 


 04/28/20 06:00   123 H  26 H  117/60  89 L


 


 04/28/20 05:00   125 H  22 H  104/58 L  92 L


 


 04/28/20 04:00  97.9 F  121 H  20  114/61  94 L


 


 04/28/20 03:00   120 H  20  116/59 L  95


 


 04/28/20 02:00   123 H  21 H  107/64  92 L


 


 04/28/20 01:00   118 H  19  107/63  86 L











Date Exam was Performed: 04/28/20


Time Exam was Performed: 12:56





- Problem List Review


Problem List Initiated/Reviewed/Updated: Yes





- My Orders


Last 24 Hours: 


My Active Orders





04/28/20 09:30


Magnesium Oxide   400 mg PO BID 





04/28/20 10:00


Magnesium Sulfate/Water [Magnesium Sulfate in Water Premix] 2 gm   Premix Bag 1 

bag IV Q6H 





04/28/20 11:15


Dextrose 5%-Lactated Ringers 1,000 ml IV ASDIRECTED 





04/28/20 12:49


Potassium Chloride Riders [KCL 40 MEQ in Water 100 ML] 40 meq   Premix Bag 1 

bag IV ONETIME 





04/28/20 13:00


Lactobacillus Rhamnosus GG [Culturelle]   1 cap PO BID 


cefTAZidime Pentahydrate [Fortaz] 1 gm   Sodium Chloride 0.9% [Normal Saline] 

50 ml IV Q8H 





04/29/20 05:00


CBC WITH AUTO DIFF [HEME] Timed 


COMPREHENSIVE METABOLIC PN,CMP [CHEM] Timed 


MAGNESIUM [CHEM] Timed 














- Plan


Plan:: 





ASSESSMENT AND PLAN  





Acute cholecystitis with septic shock-underlying blood pressures with 

persistent sinus tachycardia.  Continues to require intermittent use of 

norepinephrine to maintain adequate blood pressure.  Remains weak and lethargic

, respiratory status stable.  Blood cultures have grown out pansensitive E. 

coli.


-Change antibiotic therapy to ceftazidime 1 g IV every 8 hours


-Continue IV fluids until oral intake improves





Hypokalemia-resolved


-Continue to monitor





Down syndrome-significant disability at this time.  Difficulty with 

communication.


-Continue home medications





Maintenance issues - 


- DVT prophylaxis -mechanical today, enoxaparin started tomorrow


- Nutrition -advance per surgical instructions


- Maldonado catheter -placed in the emergency room for strict intake and output 

monitoring in a critically ill patient





Admission justification -this patient will be admitted for inpatient services 

and is medically appropriate meeting medical necessity for inpatient admission 

as outlined in my documentation.  I reasonably expect the patient will require 

inpatient services that span a period time over 2 midnights. I reasonably 

expect this patient to be discharged or transferred within 96 hours after 

admission to the [facility].

## 2020-04-28 NOTE — CRLCR
INDICATION:



Hypoxia.



TECHNIQUE:



Portable AP upright chest radiograph.



COMPARISON:



04/26/2020.



FINDINGS:



Stable heart size, within normal limits for portable.



TECHNIQUE:



There are new patchy opacities in the right lung base. There is also a new 

left lower lobe retrocardiac opacity with probable air bronchogram in the 

left lower lobe. No pneumothorax. No definite pleural effusion. There are 

no acute osseous abnormalities identified. Cholecystectomy clips.



IMPRESSION:



1. New patchy opacities in right lower lung and in left lung base could be 

due to aspiration, multifocal pneumonia, or atelectasis. Left lung base 

opacity could be due to left lower lobe collapse.



Dictated by Сергей Gurrola MD @ Apr 28 2020  4:21PM



Signed by Dr. Сергей Gurrola @ Apr 28 2020  4:24PM

## 2020-04-29 RX ADMIN — Medication SCH CAP: at 20:11

## 2020-04-29 RX ADMIN — POTASSIUM CHLORIDE SCH MEQ: 750 CAPSULE, EXTENDED RELEASE ORAL at 08:36

## 2020-04-29 RX ADMIN — TAZOBACTAM SODIUM AND PIPERACILLIN SODIUM SCH MLS/HR: 375; 3 INJECTION, SOLUTION INTRAVENOUS at 20:25

## 2020-04-29 RX ADMIN — TAZOBACTAM SODIUM AND PIPERACILLIN SODIUM SCH MLS/HR: 375; 3 INJECTION, SOLUTION INTRAVENOUS at 07:27

## 2020-04-29 RX ADMIN — TAZOBACTAM SODIUM AND PIPERACILLIN SODIUM SCH MLS/HR: 375; 3 INJECTION, SOLUTION INTRAVENOUS at 02:02

## 2020-04-29 RX ADMIN — Medication SCH CAP: at 08:35

## 2020-04-29 RX ADMIN — PANTOPRAZOLE SODIUM SCH MG: 40 GRANULE, DELAYED RELEASE ORAL at 08:39

## 2020-04-29 RX ADMIN — LEVOFLOXACIN SCH MLS/HR: 750 INJECTION, SOLUTION INTRAVENOUS at 16:15

## 2020-04-29 RX ADMIN — TAZOBACTAM SODIUM AND PIPERACILLIN SODIUM SCH MLS/HR: 375; 3 INJECTION, SOLUTION INTRAVENOUS at 13:08

## 2020-04-29 NOTE — PN
DATE OF SERVICE:  04/29/2020

 

SUBJECTIVE:  The patient continues to improve.  He is off BiPAP.  He is now alert.  No

nausea, vomiting, shortness of breath, or chest pain.  Tolerating diet.

 

OBJECTIVE:  VITAL SIGNS:  Stable.  He is saturating on a few liters at about 90%.

CARDIOVASCULAR: Regular rhythm and rate.

RESPIRATORY: Lungs are clear to auscultation bilaterally.

ABDOMEN: Incision healing well.

 

ASSESSMENT:  Status post necrotic gallbladder with septic shock.

 

PLAN:  We will continue to manage with the hospitalist service. Continue to work on diet and

activity.  The patient will remain in intensive care unit today.

 

 

 

 

Omari Cuevas MD

DD:  04/29/2020 08:30:49

DT:  04/29/2020 08:46:51

Job #:  [id number]

## 2020-04-29 NOTE — PCM.PN
- General Info


Date of Service: 04/29/20


Subjective Update: 





Mr. Reddy experience significant hypoxia yesterday, blood gases showed no 

CO2 retention.  Chest x-ray showed bilateral infiltrates consistent with 

possible aspiration.  He is more stable this morning currently on nasal cannula 

with good saturations.  He is unable to provide a meaningful history concerning 

symptoms or review of systems because of his Down syndrome and underlying 

dementia.





- Patient Data


Vitals - Most Recent: 


 Last Vital Signs











Temp  97.6 F   04/29/20 07:00


 


Pulse  103 H  04/29/20 07:00


 


Resp  11 L  04/29/20 07:00


 


BP  116/70   04/29/20 07:00


 


Pulse Ox  100   04/29/20 07:00











Weight - Most Recent: 160 lb


I&O - Last 24 Hours: 


 Intake & Output











 04/28/20 04/29/20 04/29/20





 22:59 06:59 14:59


 


Intake Total 955 1585 


 


Output Total 90  


 


Balance 865 1585 











Lab Results Last 24 Hours: 


 Laboratory Results - last 24 hr











  04/28/20 04/29/20 04/29/20 Range/Units





  14:52 04:42 04:42 


 


WBC   10.2   (4.5-11.0)  K/uL


 


RBC   3.01 L   (4.30-5.90)  M/uL


 


Hgb   9.6 L   (12.0-15.0)  g/dL


 


Hct   31.0 L   (40.0-54.0)  %


 


MCV   103 H   (80-98)  fL


 


MCH   32 H   (27-31)  pg


 


MCHC   31 L   (32-36)  %


 


Plt Count   137 L   (150-400)  K/uL


 


Neut % (Auto)   88 H   (36-66)  %


 


Lymph % (Auto)   8 L   (24-44)  %


 


Mono % (Auto)   5   (2-6)  %


 


Eos % (Auto)   0 L   (2-4)  %


 


Baso % (Auto)   0   (0-1)  %


 


Puncture Site  Rt brachial    


 


ABG pH  7.424    (7.350-7.450)  


 


ABG pCO2  38.3    (35.0-42.0)  mmHg


 


ABG pO2  67.1 L    (75.0-100.0)  mmHg


 


ABG HCO3  24.7    (22.0-26.0)  mmol/L


 


ABG Total CO2  22.8 L    (23.0-27.0)  mmol/L


 


ABG O2 Saturation  94.1 L    (95.0-98.0)  %


 


ABG O2 Content  13.3 L    (15.0-23.0)  %vol


 


ABG Base Excess  0.8    mm/L


 


ABG Hemoglobin  10.2 L    (13.5-18.0)  g/dL


 


ABG Oxyhemoglobin  92.1    %


 


ABG Carboxyhemoglobin  1.4    (0.0-1.6)  %


 


ABG Methemoglobin  0.7    %


 


Dane Test  Not performed    


 


O2 Delivery Device  Nasal cannula    


 


Oxygen Flow Rate      L


 


Sodium    144  (140-148)  mmol/L


 


Potassium    3.8  (3.6-5.2)  mmol/L


 


Chloride    110 H  (100-108)  mmol/L


 


Carbon Dioxide    29  (21-32)  mmol/L


 


Anion Gap    8.8  (5.0-14.0)  mmol/L


 


BUN    9  (7-18)  mg/dL


 


Creatinine    0.8  (0.8-1.3)  mg/dL


 


Est Cr Clr Drug Dosing    96.19  mL/min


 


Estimated GFR (MDRD)    > 60  (>60)  


 


Glucose    112 H  ()  mg/dL


 


Calcium    7.3 L  (8.5-10.1)  mg/dL


 


Magnesium    2.0  (1.8-2.4)  mg/dL


 


Total Bilirubin    0.6  (0.2-1.0)  mg/dL


 


AST    36  (15-37)  U/L


 


ALT    99 H  (12-78)  U/L


 


Alkaline Phosphatase    163 H  ()  U/L


 


Total Protein    4.9 L  (6.4-8.2)  g/dL


 


Albumin    1.4 L  (3.4-5.0)  g/dL


 


Globulin    3.5  (2.3-3.5)  g/dL


 


Albumin/Globulin Ratio    0.4 L  (1.2-2.2)  











Roberto Results Last 24 Hours: 


 Microbiology











 04/26/20 09:15 Gram Stain - Final





 Abdominal Fluid - Aspirate Wound Culture - Final





    NO GROWTH AFTER 3 DAYS





 Anaerobic Culture - Final





    NO GROWTH AFTER 3 DAYS


 


 04/26/20 05:40 Urine Culture - Final





 Urine, Bladder    NO GROWTH AFTER 2 DAYS


 


 04/26/20 05:55 Aerobic Blood Culture - Final





 Blood - Venous - Lab Draw    Escherichia Coli





 Anaerobic Blood Culture - Final





    Escherichia Coli


 


 04/26/20 05:50 Aerobic Blood Culture - Final





 Blood - Venous    Escherichia Coli





 Anaerobic Blood Culture - Final





    Escherichia Coli











Med Orders - Current: 


 Current Medications





Hydrocodone Bitart/Acetaminophen (Norco 325-5 Mg)  1 tab PO Q4H PRN


   PRN Reason: Pain (moderate 4-6)


   Last Admin: 04/28/20 02:15 Dose:  1 tab


Benzocaine/Menthol (Cepacol Sore Throat)  1 lozenge MUCMEM Q4H PRN


   PRN Reason: Sore Throat


Docusate Sodium (Colace)  100 mg PO BID PRN


   PRN Reason: Constipation


Donepezil HCl (Aricept)  10 mg PO DAILY [facility]


   Last Admin: 04/29/20 08:33 Dose:  10 mg


Enoxaparin Sodium (Lovenox)  40 mg SUBCUT DAILY [facility]


   Last Admin: 04/29/20 08:36 Dose:  40 mg


Hydrocortisone (Hydrocortisone 1% Crm)  0 gm TOP BID PRN


   PRN Reason: Rash


Hydroxyzine HCl (Vistaril)  100 mg IM Q4H PRN


   PRN Reason: Breakthrough Pain


Levofloxacin/Dextrose 750 mg/ (Premix)  150 mls @ 100 mls/hr IV Q24H [facility]


   Last Admin: 04/28/20 16:59 Dose:  100 mls/hr


Piperacillin/Tazobactam/ (Dextrose 3.375 gm/ Premix)  50 mls @ 100 mls/hr IV 

Q6H [facility]


   Last Admin: 04/29/20 07:27 Dose:  100 mls/hr


Lactobacillus Rhamnosus (Culturelle)  1 cap PO BID [facility]


   Last Admin: 04/29/20 08:35 Dose:  1 cap


Levetiracetam (Keppra)  750 mg PO BID [facility]


   Last Admin: 04/29/20 08:35 Dose:  750 mg


Magnesium Oxide (Magnesium Oxide)  400 mg PO BID [facility]


   Last Admin: 04/29/20 08:36 Dose:  400 mg


Ondansetron HCl (Zofran Odt)  4 mg PO Q4H PRN


   PRN Reason: Nausea/Vomiting


Ondansetron HCl (Zofran)  4 mg IVPUSH Q4H PRN


   PRN Reason: Nausea/Vomiting


Oxcarbazepine (Trileptal)  150 mg PO BID [facility]


   Last Admin: 04/29/20 08:37 Dose:  150 mg


Pantoprazole Sodium (Protonix Granules***)  40 mg PO DAILY@0730 [facility]


   Last Admin: 04/29/20 08:39 Dose:  40 mg


Potassium Chloride (Potassium Chloride)  10 meq PO DAILY [facility]


   Last Admin: 04/29/20 08:36 Dose:  10 meq


Quetiapine Fumarate (Seroquel)  50 mg PO TID [facility]


   Last Admin: 04/29/20 08:37 Dose:  50 mg


Sertraline HCl (Zoloft)  50 mg PO DAILY [facility]


   Last Admin: 04/29/20 08:39 Dose:  50 mg


Zolpidem Tartrate (Ambien)  5 mg PO BEDTIME PRN


   PRN Reason: Sleep





Discontinued Medications





Acetaminophen (Tylenol)  650 mg RECTAL NOW ONE


   Stop: 04/26/20 05:59


   Last Admin: 04/26/20 06:03 Dose:  650 mg


Bupivacaine HCl (Marcaine 0.5%) Confirm Administered Dose 50 ml .ROUTE .STK-MED 

ONE


   Stop: 04/26/20 08:35


   Last Admin: 04/26/20 09:29 Dose:  20 ml


Ropivacaine 36 ml/Dexamethasone 8 mg/Epinephrine HCl 0.4 mg/ Sodium Chloride 

41.6 ml  [facility]


   Last Admin: 04/26/20 09:35 Dose:  80 syringe


Dexamethasone (Dexamethasone) Confirm Administered Dose 4 mg .ROUTE .STK-MED ONE


   Stop: 04/26/20 08:42


Enoxaparin Sodium (Lovenox)  40 mg SUBCUT DAILY [facility]


   Last Admin: 04/28/20 08:54 Dose:  40 mg


Fentanyl (Sublimaze) Confirm Administered Dose 250 mcg .ROUTE .STK-MED ONE


   Stop: 04/26/20 08:42


Glycopyrrolate (Robinul) Confirm Administered Dose 1 mg .ROUTE .STK-MED ONE


   Stop: 04/26/20 08:42


Sodium Chloride (Normal Saline)  1,000 mls @ 999 [facility]ls[facility]


   Last Admin: 04/26/20 05:40 Dose:  999 mls/hr


Sodium Chloride (Normal Saline)  1,000 mls @ 999 [facility]ls[facility]


   Last Admin: 04/26/20 06:30 Dose:  999 mls/hr


Piperacillin Sod/Tazobactam (Sod 4.5 gm/ Sodium Chloride)  100 mls @ 100 mls/hr 

IV ONETIME ONE


   Stop: 04/26/20 07:16


   Last Admin: 04/26/20 06:35 Dose:  100 mls/hr


Aztreonam 1 gm/ Sodium (Chloride)  50 mls @ 100 mls/hr IV ONETIME ONE


   Stop: 04/26/20 07:01


   Last Admin: 04/26/20 06:53 Dose:  100 mls/hr


Sodium Chloride (Normal Saline) Confirm Administered Dose 100 mls @ as directed 

.ROUTE .[facility]-MED ONE


   Stop: 04/26/20 06:33


   Last Admin: 04/26/20 06:40 Dose:  Not Given


Sodium Chloride (Normal Saline) Confirm Administered Dose 100 mls @ as directed 

.ROUTE .[facility]-MED ONE


   Stop: 04/26/20 06:34


   Last Admin: 04/26/20 06:40 Dose:  Not Given


Sodium Chloride (Normal Saline)  1,000 mls @ 500 mls/hr IV ASDIRECTED [facility]


   Last Admin: 04/26/20 07:36 Dose:  500 mls/hr


Lactated Ringer's (Ringers, Lactated) Confirm Administered Dose 1,000 mls @ as 

directed .ROUTE .[facility] ONE


   Stop: 04/26/20 09:01


Sodium Chloride (Normal Saline) Confirm Administered Dose 10 mls @ as directed 

.ROUTE .[facility]-MED ONE


   Stop: 04/26/20 09:05


Lactated Ringer's (Ringers, Lactated) Confirm Administered Dose 1,000 mls @ as 

directed .ROUTE .[facility]-MED ONE


   Stop: 04/26/20 09:08


Lactated Ringer's (Ringers, Lactated) Confirm Administered Dose 1,000 mls @ as 

directed .ROUTE .[facility]-MED ONE


   Stop: 04/26/20 09:38


Piperacillin/Tazobactam/ (Dextrose 3.375 gm/ Premix)  50 mls @ 100 mls/hr IV 

Q6H [facility]


   Last Admin: 04/28/20 07:56 Dose:  100 mls/hr


Potassium Cl/Dextrose/Lact Ringer's (D5 Lr With 20 Meq Kcl)  1,000 mls @ 125 mls

/hr IV ASDIRECTED [facility]


   Last Admin: 04/26/20 21:54 Dose:  125 mls/hr


Vancomycin HCl 1 gm/ Sodium (Chloride)  250 mls @ 166.667 mls/hr IV Q12H [facility]


   Last Admin: 04/28/20 00:19 Dose:  166.667 mls/hr


Norepinephrine Bitartrate 4 mg (/ Dextrose/Water)  250 mls @ 7.5 mls/hr IV 

TITRATE VASYL; Protocol


   Last Titration: 04/28/20 02:00 Dose:  0 mcg/min, 0 mls/hr


Potassium Cl/Dextrose/Lact Ringer's (D5 Lr With 20 Meq Kcl)  1,000 mls @ 75 mls/

hr IV ASDIRECTED [facility]


   Last Admin: 04/27/20 18:42 Dose:  75 mls/hr


Magnesium Sulfate 2 gm/ Premix  50 mls @ 25 mls/hr IV Q6H [facility]


   Stop: 04/28/20 17:59


   Last Admin: 04/28/20 16:02 Dose:  25 mls/hr


Dextrose/Lactated Ringer's (Dextrose 5%-Lactated Ringers)  1,000 mls @ 150 mls/

hr IV ASDIRECTED [facility]


   Last Admin: 04/29/20 07:23 Dose:  150 mls/hr


Potassium Chloride 20 meq/Lidocaine HCl 2 ml/ Sodium Chloride  112 mls @ 56 mls/

hr IV Q2H [facility]


   Stop: 04/28/20 17:59


   Last Admin: 04/28/20 16:57 Dose:  56 mls/hr


Ceftazidime 1 gm/ Sodium (Chloride)  50 mls @ 100 mls/hr IV Q8HR [facility]


   Last Admin: 04/28/20 13:58 Dose:  100 mls/hr


Lactated Ringer's (Ringers, Lactated)  1,000 mls @ 500 mls/hr IV ASDIRECTED [facility]


   Stop: 04/28/20 15:59


   Last Admin: 04/28/20 14:03 Dose:  500 mls/hr


Lactated Ringer's (Ringers, Lactated)  1,000 ml IRR .STK-MED ONE


   Stop: 04/26/20 09:16


   Last Admin: 04/26/20 09:15 Dose:  1,000 ml


Lidocaine/Epinephrine (Xylocaine 1% With Epinephrine 1:100,000) Confirm 

Administered Dose 50 ml .ROUTE .STK-MED ONE


   Stop: 04/26/20 08:35


   Last Admin: 04/26/20 09:29 Dose:  20 ml


Neostigmine Methylsulfate (Neostigmine) Confirm Administered Dose 5 mg .ROUTE 

.STK-MED ONE


   Stop: 04/26/20 08:42


Ondansetron HCl (Zofran) Confirm Administered Dose 4 mg .ROUTE .STK-MED ONE


   Stop: 04/26/20 08:42


Pantoprazole Sodium (Protonix***)  40 mg PO ACBREAKFAST [facility]


   Last Admin: 04/27/20 08:20 Dose:  40 mg


Phenylephrine HCl (Niraj-Synephrine) Confirm Administered Dose 10 mg .ROUTE .STK-

MED ONE


   Stop: 04/26/20 09:05


Piperacillin Sod/Tazobactam Sod (Zosyn) Confirm Administered Dose 4.5 gm .ROUTE 

.STK-MED ONE


   Stop: 04/26/20 06:31


   Last Admin: 04/26/20 06:39 Dose:  Not Given


Propofol (Diprivan  20 Ml) Confirm Administered Dose 200 mg .ROUTE .STK-MED ONE


   Stop: 04/26/20 08:42


Rocuronium Bromide (Zemuron) Confirm Administered Dose 50 mg .ROUTE .STK-MED ONE


   Stop: 04/26/20 08:42


Succinylcholine Chloride (Quelicin) Confirm Administered Dose 200 mg .ROUTE .STK

-MED ONE


   Stop: 04/26/20 08:42











- Exam


Quality Assessment: Supplemental Oxygen, DVT Prophylaxis


General: Alert, Oriented, Cooperative, Mild Distress


Lungs: Clear to Auscultation, Normal Respiratory Effort


Cardiovascular: Regular Rate, Regular Rhythm, No Murmurs


GI/Abdominal Exam: Soft, No Organomegaly, Tender.  No: Distended, Guarding, 

Rigid, Rebound


Extremities: Non-Tender, No Pedal Edema





Sepsis Event Note





- Evaluation


Sepsis Screening Result: No Definite Risk





- Focused Exam


Vital Signs: 


 Vital Signs











  Temp Pulse Resp BP Pulse Ox


 


 04/29/20 07:00  97.6 F  103 H  11 L  116/70  100


 


 04/29/20 06:00    17  117/74  99


 


 04/29/20 05:00    15  114/69  98


 


 04/29/20 04:00  97.5 F   21 H  118/72  98


 


 04/29/20 03:00    15  122/65  98


 


 04/29/20 02:00    14  119/74  99


 


 04/29/20 01:00    13  117/75  100


 


 04/29/20 00:00    15  108/72  100


 


 04/28/20 23:00  96.9 F   17  112/79  100


 


 04/28/20 22:00    14  107/62  97











Date Exam was Performed: 04/29/20


Time Exam was Performed: 09:12





- Problem List Review


Problem List Initiated/Reviewed/Updated: Yes





- My Orders


Last 24 Hours: 


My Active Orders





04/28/20 09:30


Magnesium Oxide   400 mg PO BID 





04/28/20 13:00


Lactobacillus Rhamnosus GG [Culturelle]   1 cap PO BID 





04/28/20 14:52


BIPAP Adult [RT BiPAP/CPAP] [RC] ASDIRECTED 





04/28/20 17:00


Levofloxacin/Dextrose 5%-Water [Levaquin in D5W 750 MG/150 ML] 750 mg   Premix 

Bag 1 bag IV Q24H 





04/28/20 20:00


Piperacillin/Tazobactam/Dext [Zosyn in Dextrose Iso-Osmotic 3.375 GM] 3.375 gm 

  Premix Bag 1 bag IV Q6H 





04/29/20 09:00


Enoxaparin [Lovenox]   40 mg SUBCUT DAILY 





04/29/20 09:15


Dextrose 5%-Lactated Ringers @  75 MLS/HR(1000ml) Dextrose 5%-Lactated Ringers 1

,000 ml IV ASDIRECTED 





04/30/20 05:00


CBC WITH AUTO DIFF [HEME] Timed 


COMPREHENSIVE METABOLIC PN,CMP [CHEM] Timed 














- Plan


Plan:: 





ASSESSMENT AND PLAN  





Acute cholecystitis with septic shock-improved during the night, more alert and 

interactive this morning.  Respiratory status has stabilized after episode of 

hypoxia yesterday


-Continue levofloxacin and Zosyn, with possible underlying aspiration


-Continue IV fluids until oral intake improves





Aspiration pneumonia-episode of hypoxia yesterday, chest x-ray shows evidence 

of bilateral infiltrates versus atelectasis.  He was placed on noninvasive 

positive pressure ventilation yesterday because of hypoxia, currently now on 

nasal cannula with good oxygenation


-Biotic therapy as above





Hypokalemia-resolved


-Continue to monitor





Down syndrome-significant disability at this time.  Difficulty with 

communication.


-Continue home medications





Maintenance issues - 


- DVT prophylaxis -mechanical today, enoxaparin started tomorrow


- Nutrition -advance per surgical instructions


- Maldonado catheter -placed in the emergency room for strict intake and output 

monitoring in a critically ill patient





Admission justification -this patient will be admitted for inpatient services 

and is medically appropriate meeting medical necessity for inpatient admission 

as outlined in my documentation.  I reasonably expect the patient will require 

inpatient services that span a period time over 2 midnights. I reasonably 

expect this patient to be discharged or transferred within 96 hours [facility]

## 2020-04-29 NOTE — PN
DATE OF SERVICE:  04/28/2020

 

SUBJECTIVE:  The patient continues to slowly improve.  He is nonverbal as his baseline.  No

specific concerns from nursing staff.

 

OBJECTIVE:  VITAL SIGNS:  Vital signs are improving.  Temperature 98.9, blood pressure

115/64, pulse 124.

CARDIOVASCULAR:  Regular rhythm and rate.

RESPIRATORY:  Lungs are clear to auscultation bilaterally.

SKIN:  Incision healing well.

 

ASSESSMENT AND PLAN:

1. Status post cholecystectomy.

2. Septic shock.

 

As far as gallbladder is concerned, the patient continues to do well.  His sepsis has

improved, and his blood pressure has improved.  Continue to manage with hospitalist service.

Please see their notes for further details.

 

 

 

 

Omari Cuevas MD

DD:  04/28/2020 14:17:48

DT:  04/28/2020 15:28:47

Job #:  [id number]

## 2020-04-30 RX ADMIN — LEVOFLOXACIN SCH MLS/HR: 750 INJECTION, SOLUTION INTRAVENOUS at 17:05

## 2020-04-30 RX ADMIN — Medication SCH CAP: at 21:25

## 2020-04-30 RX ADMIN — TAZOBACTAM SODIUM AND PIPERACILLIN SODIUM SCH MLS/HR: 375; 3 INJECTION, SOLUTION INTRAVENOUS at 01:58

## 2020-04-30 RX ADMIN — TAZOBACTAM SODIUM AND PIPERACILLIN SODIUM SCH MLS/HR: 375; 3 INJECTION, SOLUTION INTRAVENOUS at 14:02

## 2020-04-30 RX ADMIN — PANTOPRAZOLE SODIUM SCH MG: 40 GRANULE, DELAYED RELEASE ORAL at 08:42

## 2020-04-30 RX ADMIN — Medication SCH CAP: at 08:43

## 2020-04-30 RX ADMIN — TAZOBACTAM SODIUM AND PIPERACILLIN SODIUM SCH MLS/HR: 375; 3 INJECTION, SOLUTION INTRAVENOUS at 07:47

## 2020-04-30 RX ADMIN — POTASSIUM CHLORIDE SCH MEQ: 750 CAPSULE, EXTENDED RELEASE ORAL at 08:43

## 2020-04-30 RX ADMIN — SODIUM CHLORIDE SCH MLS/HR: 9 INJECTION, SOLUTION INTRAVENOUS at 10:12

## 2020-04-30 RX ADMIN — TAZOBACTAM SODIUM AND PIPERACILLIN SODIUM SCH MLS/HR: 375; 3 INJECTION, SOLUTION INTRAVENOUS at 21:13

## 2020-04-30 RX ADMIN — SODIUM CHLORIDE SCH MLS/HR: 9 INJECTION, SOLUTION INTRAVENOUS at 12:01

## 2020-04-30 NOTE — PN
DATE OF SERVICE:  04/30/2020

 

SUBJECTIVE:  The patient is doing even better today.  No nausea, vomiting, shortness of

breath, or chest pain.

 

OBJECTIVE:  VITAL SIGNS:  Stable.

CARDIOVASCULAR:  Regular rhythm and rate.

RESPIRATORY:  Poor inspiratory effort bilaterally.

SKIN:  Incisions healing very well.

 

ASSESSMENT:  Status post laparoscopic cholecystectomy/sepsis.

 

PLAN:  The patient will probably be transferred out of the ICU today to the floor.  Continue

IV antibiotics.  Medicine plan per hospitalist service.

 

 

 

 

Omari Cuevas MD

DD:  04/30/2020 07:54:23

DT:  04/30/2020 08:35:56

Job #:  [id number]

## 2020-04-30 NOTE — PCM.PN
- General Info


Date of Service: 04/30/20


Subjective Update: 





Mr. Reddy has remained stable since yesterday.  Vital signs have been good 

and he has remained afebrile.  Respiratory status also stable with adequate 

oxygenation, no further episodes of hypoxia.  He is otherwise unable to provide 

a meaningful history concerning recent symptoms or review of systems because of 

his Down syndrome and underlying dementia.





- Patient Data


Vitals - Most Recent: 


 Last Vital Signs











Temp  98 F   04/30/20 07:56


 


Pulse  113 H  04/30/20 07:56


 


Resp  26 H  04/30/20 07:56


 


BP  127/78   04/30/20 07:56


 


Pulse Ox  94 L  04/30/20 07:56











Weight - Most Recent: 160 lb


I&O - Last 24 Hours: 


 Intake & Output











 04/29/20 04/30/20 04/30/20





 22:59 06:59 14:59


 


Intake Total 1811 875 50


 


Output Total 300 350 


 


Balance 1511 525 50











Lab Results Last 24 Hours: 


 Laboratory Results - last 24 hr











  04/30/20 04/30/20 Range/Units





  05:05 05:05 


 


WBC  7.7   (4.5-11.0)  K/uL


 


RBC  3.07 L   (4.30-5.90)  M/uL


 


Hgb  9.5 L   (12.0-15.0)  g/dL


 


Hct  31.1 L   (40.0-54.0)  %


 


MCV  101 H   (80-98)  fL


 


MCH  31   (27-31)  pg


 


MCHC  31 L   (32-36)  %


 


Plt Count  136 L   (150-400)  K/uL


 


Neut % (Auto)  79 H   (36-66)  %


 


Lymph % (Auto)  10 L   (24-44)  %


 


Mono % (Auto)  10 H   (2-6)  %


 


Eos % (Auto)  0 L   (2-4)  %


 


Baso % (Auto)  0   (0-1)  %


 


Sodium   142  (140-148)  mmol/L


 


Potassium   3.4 L  (3.6-5.2)  mmol/L


 


Chloride   108  (100-108)  mmol/L


 


Carbon Dioxide   28  (21-32)  mmol/L


 


Anion Gap   9.4  (5.0-14.0)  mmol/L


 


BUN   5 L  (7-18)  mg/dL


 


Creatinine   0.7 L  (0.8-1.3)  mg/dL


 


Est Cr Clr Drug Dosing   109.93  mL/min


 


Estimated GFR (MDRD)   > 60  (>60)  


 


Glucose   101  ()  mg/dL


 


Calcium   7.0 L  (8.5-10.1)  mg/dL


 


Total Bilirubin   0.5  (0.2-1.0)  mg/dL


 


AST   20  (15-37)  U/L


 


ALT   70  (12-78)  U/L


 


Alkaline Phosphatase   164 H  ()  U/L


 


Total Protein   4.7 L  (6.4-8.2)  g/dL


 


Albumin   1.3 L  (3.4-5.0)  g/dL


 


Globulin   3.4  (2.3-3.5)  g/dL


 


Albumin/Globulin Ratio   0.4 L  (1.2-2.2)  











Roberto Results Last 24 Hours: 


 Microbiology











 04/26/20 09:15 Gram Stain - Final





 Abdominal Fluid - Aspirate Wound Culture - Final





    NO GROWTH AFTER 3 DAYS





 Anaerobic Culture - Final





    NO GROWTH AFTER 3 DAYS











Med Orders - Current: 


 Current Medications





Hydrocodone Bitart/Acetaminophen (Norco 325-5 Mg)  1 tab PO Q4H PRN


   PRN Reason: Pain (moderate 4-6)


   Last Admin: 04/28/20 02:15 Dose:  1 tab


Benzocaine/Menthol (Cepacol Sore Throat)  1 lozenge MUCMEM Q4H PRN


   PRN Reason: Sore Throat


Docusate Sodium (Colace)  100 mg PO BID PRN


   PRN Reason: Constipation


Donepezil HCl (Aricept)  10 mg PO DAILY [facility]


   Last Admin: 04/30/20 08:43 Dose:  10 mg


Enoxaparin Sodium (Lovenox)  40 mg SUBCUT DAILY [facility]


   Last Admin: 04/30/20 08:44 Dose:  40 mg


Hydrocortisone (Hydrocortisone 1% Crm)  0 gm TOP BID PRN


   PRN Reason: Rash


Hydroxyzine HCl (Vistaril)  100 mg IM Q4H PRN


   PRN Reason: Breakthrough Pain


Levofloxacin/Dextrose 750 mg/ (Premix)  150 mls @ 100 mls/hr IV Q24H [facility]


   Last Admin: 04/29/20 16:15 Dose:  100 mls/hr


Piperacillin/Tazobactam/ (Dextrose 3.375 gm/ Premix)  50 mls @ 100 mls/hr IV 

Q6H [facility]


   Last Admin: 04/30/20 07:47 Dose:  100 mls/hr


Dextrose/Lactated Ringer's (Dextrose 5%-Lactated Ringers)  1,000 mls @ 75 mls/

hr IV ASDIRECTED [facility]


   Last Admin: 04/30/20 06:38 Dose:  75 mls/hr


Potassium Chloride 20 meq/Lidocaine HCl 2 ml/ Sodium Chloride  112 mls @ 56 mls/

hr IV Q2H [facility]


   Stop: 04/30/20 13:59


Lactobacillus Rhamnosus (Culturelle)  1 cap PO BID [facility]


   Last Admin: 04/30/20 08:43 Dose:  1 cap


Levetiracetam (Keppra)  750 mg PO BID [facility]


   Last Admin: 04/30/20 08:43 Dose:  750 mg


Magnesium Oxide (Magnesium Oxide)  400 mg PO BID [facility]


   Last Admin: 04/30/20 08:43 Dose:  400 mg


Ondansetron HCl (Zofran Odt)  4 mg PO Q4H PRN


   PRN Reason: Nausea/Vomiting


Ondansetron HCl (Zofran)  4 mg IVPUSH Q4H PRN


   PRN Reason: Nausea/Vomiting


Oxcarbazepine (Trileptal)  150 mg PO BID [facility]


   Last Admin: 04/30/20 08:42 Dose:  150 mg


Pantoprazole Sodium (Protonix Granules***)  40 mg PO DAILY@0730 [facility]


   Last Admin: 04/30/20 08:42 Dose:  40 mg


Potassium Chloride (Potassium Chloride)  10 meq PO DAILY [facility]


   Last Admin: 04/30/20 08:43 Dose:  10 meq


Quetiapine Fumarate (Seroquel)  50 mg PO TID [facility]


   Last Admin: 04/30/20 08:43 Dose:  50 mg


Sertraline HCl (Zoloft)  50 mg PO DAILY [facility]


   Last Admin: 04/30/20 08:45 Dose:  50 mg


Zolpidem Tartrate (Ambien)  5 mg PO BEDTIME PRN


   PRN Reason: Sleep





Discontinued Medications





Acetaminophen (Tylenol)  650 mg RECTAL NOW ONE


   Stop: 04/26/20 05:59


   Last Admin: 04/26/20 06:03 Dose:  650 mg


Bupivacaine HCl (Marcaine 0.5%) Confirm Administered Dose 50 ml .ROUTE .STK-MED 

ONE


   Stop: 04/26/20 08:35


   Last Admin: 04/26/20 09:29 Dose:  20 ml


Ropivacaine 36 ml/Dexamethasone 8 mg/Epinephrine HCl 0.4 mg/ Sodium Chloride 

41.6 ml  [facility]


   Last Admin: 04/26/20 09:35 Dose:  80 syringe


Dexamethasone (Dexamethasone) Confirm Administered Dose 4 mg .ROUTE .STK-MED ONE


   Stop: 04/26/20 08:42


Enoxaparin Sodium (Lovenox)  40 mg SUBCUT DAILY [facility]


   Last Admin: 04/28/20 08:54 Dose:  40 mg


Fentanyl (Sublimaze) Confirm Administered Dose 250 mcg .ROUTE .STK-MED ONE


   Stop: 04/26/20 08:42


Glycopyrrolate (Robinul) Confirm Administered Dose 1 mg .ROUTE .STK-MED ONE


   Stop: 04/26/20 08:42


Sodium Chloride (Normal Saline)  1,000 mls @ 999 mls/hr IV [facility]


   Last Admin: 04/26/20 05:40 Dose:  999 mls/hr


Sodium Chloride (Normal Saline)  1,000 mls @ 999 mls/hr IV [facility]


   Last Admin: 04/26/20 06:30 Dose:  999 mls/hr


Piperacillin Sod/Tazobactam (Sod 4.5 gm/ Sodium Chloride)  100 mls @ 100 mls/hr 

IV ONETIME ONE


   Stop: 04/26/20 07:16


   Last Admin: 04/26/20 06:35 Dose:  100 mls/hr


Aztreonam 1 gm/ Sodium (Chloride)  50 mls @ 100 mls/hr IV ONETIME ONE


   Stop: 04/26/20 07:01


   Last Admin: 04/26/20 06:53 Dose:  100 mls/hr


Sodium Chloride (Normal Saline) Confirm Administered Dose 100 mls @ as directed 

.ROUTE .[facility] ONE


   Stop: 04/26/20 06:33


   Last Admin: 04/26/20 06:40 Dose:  Not Given


Sodium Chloride (Normal Saline) Confirm Administered Dose 100 mls @ as directed 

.ROUTE .[facility] ONE


   Stop: 04/26/20 06:34


   Last Admin: 04/26/20 06:40 Dose:  Not Given


Sodium Chloride (Normal Saline)  1,000 mls @ 500 mls/hr IV [facility]


   Last Admin: 04/26/20 07:36 Dose:  500 mls/hr


Lactated Ringer's (Ringers, Lactated) Confirm Administered Dose 1,000 mls @ as 

directed .ROUTE .STK-MED ONE


   Stop: 04/26/20 09:01


Sodium Chloride (Normal Saline) Confirm Administered Dose 10 mls @ as directed 

.ROUTE .STK-MED ONE


   Stop: 04/26/20 09:05


Lactated Ringer's (Ringers, Lactated) Confirm Administered Dose 1,000 mls @ as 

directed .ROUTE .STK-MED ONE


   Stop: 04/26/20 09:08


Lactated Ringer's (Ringers, Lactated) Confirm Administered Dose 1,000 mls @ as 

directed .ROUTE .STK-MED ONE


   Stop: 04/26/20 09:38


Piperacillin/Tazobactam/ (Dextrose 3.375 gm/ Premix)  50 mls @ 100 mls/hr IV 

Q6H [facility]


   Last Admin: 04/28/20 07:56 Dose:  100 mls/hr


Potassium Cl/Dextrose/Lact Ringer's (D5 Lr With 20 Meq Kcl)  1,000 mls @ 125 mls

/hr IV [facility]IRECTED [facility]


   Last Admin: 04/26/20 21:54 Dose:  125 mls/hr


Vancomycin HCl 1 gm/ Sodium (Chloride)  250 mls @ 166.667 mls/hr IV Q12H [facility]


   Last Admin: 04/28/20 00:19 Dose:  166.667 mls/hr


Norepinephrine Bitartrate 4 mg (/ Dextrose/Water)  250 mls @ 7.5 mls/hr IV 

TITRATE [facility]; Protocol


   Last Titration: 04/28/20 02:00 Dose:  0 mcg/min, 0 mls/hr


Potassium Cl/Dextrose/Lact Ringer's (D5 Lr With 20 Meq Kcl)  1,000 mls @ 75 mls/

hr IV ASDIRECTED [facility]


   Last Admin: 04/27/20 18:42 Dose:  75 mls/hr


Magnesium Sulfate 2 gm/ Premix  50 mls @ 25 mls/hr IV Q6H [facility]


   Stop: 04/28/20 17:59


   Last Admin: 04/28/20 16:02 Dose:  25 mls/hr


Dextrose/Lactated Ringer's (Dextrose 5%-Lactated Ringers)  1,000 mls @ 150 mls/

hr IV [facility]IRECTED [facility]


   Last Admin: 04/29/20 07:23 Dose:  150 mls/hr


Potassium Chloride 20 meq/Lidocaine HCl 2 ml/ Sodium Chloride  112 mls @ 56 mls/

hr IV Q2H [facility]


   Stop: 04/28/20 17:59


   Last Admin: 04/28/20 16:57 Dose:  56 mls/hr


Ceftazidime 1 gm/ Sodium (Chloride)  50 mls @ 100 mls/hr IV Q8HR [facility]


   Last Admin: 04/28/20 13:58 Dose:  100 mls/hr


Lactated Ringer's (Ringers, Lactated)  1,000 mls @ 500 mls/hr IV ASDIRECTED [facility]


   Stop: 04/28/20 15:59


   Last Admin: 04/28/20 14:03 Dose:  500 mls/hr


Lactated Ringer's (Ringers, Lactated)  1,000 ml IRR .STK-MED ONE


   Stop: 04/26/20 09:16


   Last Admin: 04/26/20 09:15 Dose:  1,000 ml


Lidocaine/Epinephrine (Xylocaine 1% With Epinephrine 1:100,000) Confirm 

Administered Dose 50 ml .ROUTE .STK-MED ONE


   Stop: 04/26/20 08:35


   Last Admin: 04/26/20 09:29 Dose:  20 ml


Neostigmine Methylsulfate (Neostigmine) Confirm Administered Dose 5 mg .ROUTE 

.STK-MED ONE


   Stop: 04/26/20 08:42


Ondansetron HCl (Zofran) Confirm Administered Dose 4 mg .ROUTE .STK-MED ONE


   Stop: 04/26/20 08:42


Pantoprazole Sodium (Protonix***)  40 mg PO ACBREAKFAST [facility]


   Last Admin: 04/27/20 08:20 Dose:  40 mg


Phenylephrine HCl (Niraj-Synephrine) Confirm Administered Dose 10 mg .ROUTE .STK-

MED ONE


   Stop: 04/26/20 09:05


Piperacillin Sod/Tazobactam Sod (Zosyn) Confirm Administered Dose 4.5 gm .ROUTE 

.STK-MED ONE


   Stop: 04/26/20 06:31


   Last Admin: 04/26/20 06:39 Dose:  Not Given


Propofol (Diprivan  20 Ml) Confirm Administered Dose 200 mg .ROUTE .STK-MED ONE


   Stop: 04/26/20 08:42


Rocuronium Bromide (Zemuron) Confirm Administered Dose 50 mg .ROUTE .STK-MED ONE


   Stop: 04/26/20 08:42


Succinylcholine Chloride (Quelicin) Confirm Administered Dose 200 mg .ROUTE .STK

-MED ONE


   Stop: 04/26/20 08:42











- Exam


Quality Assessment: DVT Prophylaxis


General: Alert, Cooperative, Mild Distress.  No: Oriented


Lungs: Clear to Auscultation, Normal Respiratory Effort


Cardiovascular: Regular Rhythm, No Murmurs, Tachycardia


GI/Abdominal Exam: Soft, Non-Tender, No Organomegaly, No Distention


Extremities: Non-Tender, No Pedal Edema





Sepsis Event Note





- Evaluation


Sepsis Screening Result: Sepsis Risk





- Focused Exam


Vital Signs: 


 Vital Signs











  Temp Pulse Resp BP Pulse Ox


 


 04/30/20 07:56  98 F  113 H  26 H  127/78  94 L


 


 04/30/20 07:00   114 H  20  128/79  96


 


 04/30/20 06:00   111 H  27 H  119/77  95


 


 04/30/20 05:00   113 H  22 H  123/81  97


 


 04/30/20 04:00   113 H  25 H  120/72  94 L


 


 04/30/20 03:00   112 H  26 H  125/67  93 L


 


 04/30/20 02:00   111 H  25 H  125/69  97


 


 04/30/20 01:00   113 H  26 H  118/71  95


 


 04/30/20 00:00  97.9 F  110 H  24 H  118/71  95


 


 04/29/20 23:00   106 H  20  116/67  93 L


 


 04/29/20 22:00   110 H  24 H  112/65  92 L











Date Exam was Performed: 04/30/20


Time Exam was Performed: 09:41





- Problem List Review


Problem List Initiated/Reviewed/Updated: Yes





- My Orders


Last 24 Hours: 


My Active Orders





04/29/20 09:00


Enoxaparin [Lovenox]   40 mg SUBCUT DAILY 





04/29/20 09:15


Dextrose 5%-Lactated Ringers 1,000 ml IV ASDIRECTED 





04/30/20 08:35


Dietary Supplements [RC] WITHMEALSANDBED 





04/30/20 10:00


Potassium Chloride 20 meq Lidocaine 1% [Xylocaine 1%] 2 ml   Sodium Chloride 0.9

% [Normal Saline] 100 ml IV Q2H 





04/30/20 Lunch


GI Soft Low Fiber [Soft Diet] [DIET] 





05/01/20 05:00


BASIC METABOLIC PANEL,BMP [CHEM] Timed 














- Plan


Plan:: 





ASSESSMENT AND PLAN  





Acute cholecystitis with septic shock-improvement over the past few days, oral 

intake remains marginal


-Continue levofloxacin and Zosyn, with possible underlying aspiration


-Continue IV fluids until oral intake improves


-Nutritional supplement 4 times daily





Aspiration pneumonia-episode of hypoxia, chest x-ray shows evidence of 

bilateral infiltrates versus atelectasis.  He was placed on noninvasive 

positive pressure ventilation yesterday because of hypoxia, currently now on 

nasal cannula with good oxygenation


-Antibiotic therapy as above





Hypokalemia


-Continue to monitor





Down syndrome-significant disability at this time.  Difficulty with 

communication.


-Continue home medications





Maintenance issues - 


- DVT prophylaxis -mechanical today, enoxaparin started tomorrow


- Nutrition -advance per surgical instructions


- Maldonado catheter -placed in the emergency room for strict intake and output 

monitoring in a critically ill patient





Admission justification -this patient will be admitted for inpatient services 

and is medically appropriate meeting medical necessity for inpatient admission 

as outlined in my documentation.  I reasonably expect the patient will require 

inpatient services that span a period time over 2 midnights. I reasonably 

expect this patient to be discharged or transferred within 96 hours after 

[facility].

## 2020-05-01 RX ADMIN — HYDROCODONE BITARTRATE AND ACETAMINOPHEN PRN TAB: 5; 325 TABLET ORAL at 21:20

## 2020-05-01 RX ADMIN — Medication SCH CAP: at 08:25

## 2020-05-01 RX ADMIN — LEVOFLOXACIN SCH MLS/HR: 750 INJECTION, SOLUTION INTRAVENOUS at 16:48

## 2020-05-01 RX ADMIN — TAZOBACTAM SODIUM AND PIPERACILLIN SODIUM SCH MLS/HR: 375; 3 INJECTION, SOLUTION INTRAVENOUS at 19:46

## 2020-05-01 RX ADMIN — TAZOBACTAM SODIUM AND PIPERACILLIN SODIUM SCH MLS/HR: 375; 3 INJECTION, SOLUTION INTRAVENOUS at 01:56

## 2020-05-01 RX ADMIN — Medication SCH CAP: at 21:16

## 2020-05-01 RX ADMIN — TAZOBACTAM SODIUM AND PIPERACILLIN SODIUM SCH MLS/HR: 375; 3 INJECTION, SOLUTION INTRAVENOUS at 08:19

## 2020-05-01 RX ADMIN — TAZOBACTAM SODIUM AND PIPERACILLIN SODIUM SCH MLS/HR: 375; 3 INJECTION, SOLUTION INTRAVENOUS at 14:56

## 2020-05-01 RX ADMIN — PANTOPRAZOLE SODIUM SCH MG: 40 GRANULE, DELAYED RELEASE ORAL at 08:25

## 2020-05-01 RX ADMIN — HYDROCODONE BITARTRATE AND ACETAMINOPHEN PRN TAB: 5; 325 TABLET ORAL at 14:55

## 2020-05-01 RX ADMIN — POTASSIUM CHLORIDE SCH MEQ: 750 CAPSULE, EXTENDED RELEASE ORAL at 08:25

## 2020-05-01 NOTE — PCM.PN
- General Info


Date of Service: 05/01/20


Subjective Update: 





Mr. Reddy has remained stable since yesterday vital signs have been fairly 

good, he continues to experience a mild tachycardia.  He has remained afebrile 

and his respiratory status also has remained stable.  He is unable to provide a 

meaningful history concerning symptoms or review of systems because of his 

underlying Down syndrome and dementia.





- Patient Data


Vitals - Most Recent: 


 Last Vital Signs











Temp  96.7 F L  05/01/20 08:00


 


Pulse  103 H  05/01/20 06:00


 


Resp  16   05/01/20 10:00


 


BP  125/71   05/01/20 10:00


 


Pulse Ox  97   05/01/20 10:00











Weight - Most Recent: 160 lb


I&O - Last 24 Hours: 


 Intake & Output











 04/30/20 05/01/20 05/01/20





 22:59 06:59 14:59


 


Intake Total 1016 848 140


 


Output Total 635 470 


 


Balance 381 378 140











Lab Results Last 24 Hours: 


 Laboratory Results - last 24 hr











  05/01/20 Range/Units





  04:00 


 


Sodium  141  (140-148)  mmol/L


 


Potassium  3.7  (3.6-5.2)  mmol/L


 


Chloride  106  (100-108)  mmol/L


 


Carbon Dioxide  31  (21-32)  mmol/L


 


Anion Gap  4.2 L  (5.0-14.0)  mmol/L


 


BUN  3 L  (7-18)  mg/dL


 


Creatinine  0.7 L  (0.8-1.3)  mg/dL


 


Est Cr Clr Drug Dosing  109.93  mL/min


 


Estimated GFR (MDRD)  > 60  (>60)  


 


Glucose  108 H  ()  mg/dL


 


Calcium  7.0 L  (8.5-10.1)  mg/dL











Med Orders - Current: 


 Current Medications





Hydrocodone Bitart/Acetaminophen (Norco 325-5 Mg)  1 tab PO Q4H PRN


   PRN Reason: Pain (moderate 4-6)


   Last Admin: 04/28/20 02:15 Dose:  1 tab


Benzocaine/Menthol (Cepacol Sore Throat)  1 lozenge MUCMEM Q4H PRN


   PRN Reason: Sore Throat


Docusate Sodium (Colace)  100 mg PO BID PRN


   PRN Reason: Constipation


Donepezil HCl (Aricept)  10 mg PO DAILY [facility]


   Last Admin: 05/01/20 08:25 Dose:  10 mg


Enoxaparin Sodium (Lovenox)  40 mg SUBCUT DAILY [facility]


   Last Admin: 05/01/20 08:26 Dose:  40 mg


Hydrocortisone (Hydrocortisone 1% Crm)  0 gm TOP BID PRN


   PRN Reason: Rash


Hydroxyzine HCl (Vistaril)  100 mg IM Q4H PRN


   PRN Reason: Breakthrough Pain


Levofloxacin/Dextrose 750 mg/ (Premix)  150 mls @ 100 mls/hr IV Q24H [facility]


   Last Admin: 04/30/20 17:05 Dose:  100 mls/hr


Piperacillin/Tazobactam/ (Dextrose 3.375 gm/ Premix)  50 mls @ 100 mls/hr IV 

Q6H [facility]


   Last Admin: 05/01/20 08:19 Dose:  100 mls/hr


Lactobacillus Rhamnosus (Culturelle)  1 cap PO BID [facility]


   Last Admin: 05/01/20 08:25 Dose:  1 cap


Levetiracetam (Keppra)  750 mg PO BID [facility]


   Last Admin: 05/01/20 08:25 Dose:  750 mg


Magnesium Oxide (Magnesium Oxide)  400 mg PO BID [facility]


   Last Admin: 05/01/20 08:25 Dose:  400 mg


Ondansetron HCl (Zofran Odt)  4 mg PO Q4H PRN


   PRN Reason: Nausea/Vomiting


Ondansetron HCl (Zofran)  4 mg IVPUSH Q4H PRN


   PRN Reason: Nausea/Vomiting


Oxcarbazepine (Trileptal)  150 mg PO BID [facility]


   Last Admin: 05/01/20 08:25 Dose:  150 mg


Pantoprazole Sodium (Protonix Granules***)  40 mg PO DAILY@0730 [facility]


   Last Admin: 05/01/20 08:25 Dose:  40 mg


Potassium Chloride (Potassium Chloride)  10 meq PO DAILY [facility]


   Last Admin: 05/01/20 08:25 Dose:  10 meq


Quetiapine Fumarate (Seroquel)  50 mg PO TID [facility]


   Last Admin: 05/01/20 08:25 Dose:  50 mg


Sertraline HCl (Zoloft)  50 mg PO DAILY [facility]


   Last Admin: 05/01/20 08:25 Dose:  50 mg


Zolpidem Tartrate (Ambien)  5 mg PO BEDTIME PRN


   PRN Reason: Sleep





Discontinued Medications





Acetaminophen (Tylenol)  650 mg RECTAL NOW ONE


   Stop: 04/26/20 05:59


   Last Admin: 04/26/20 06:03 Dose:  650 mg


Bupivacaine HCl (Marcaine 0.5%) Confirm Administered Dose 50 ml .ROUTE .K-MED 

ONE


   Stop: 04/26/20 08:35


   Last Admin: 04/26/20 09:29 Dose:  20 ml


Ropivacaine 36 ml/Dexamethasone 8 mg/Epinephrine HCl 0.4 mg/ Sodium Chloride 

41.6 ml  [facility]


   Last Admin: 04/26/20 09:35 Dose:  80 syringe


Dexamethasone (Dexamethasone) Confirm Administered Dose 4 mg .ROUTE .K-MED ONE


   Stop: 04/26/20 08:42


Enoxaparin Sodium (Lovenox)  40 mg SUBCUT DAILY [facility]


   Last Admin: 04/28/20 08:54 Dose:  40 mg


Fentanyl (Sublimaze) Confirm Administered Dose 250 mcg .ROUTE .K-MED ONE


   Stop: 04/26/20 08:42


Glycopyrrolate (Robinul) Confirm Administered Dose 1 mg .ROUTE .[facility]-MED ONE


   Stop: 04/26/20 08:42


Sodium Chloride (Normal Saline)  [facility]000 mls [facility]


   Last Admin: 04/26/20 05:40 Dose:  999 mls/hr


Sodium Chloride (Normal Saline)  1,000 mls @ 999 mls/hr IV [facility]


   Last Admin: 04/26/20 06:30 Dose:  999 mls/hr


Piperacillin Sod/Tazobactam (Sod 4.5 gm/ Sodium Chloride)  100 mls @ 100 mls/hr 

IV ONETIME ONE


   Stop: 04/26/20 07:16


   Last Admin: 04/26/20 06:35 Dose:  100 mls/hr


Aztreonam 1 gm/ Sodium (Chloride)  50 mls @ 100 mls/hr IV ONETIME ONE


   Stop: 04/26/20 07:01


   Last Admin: 04/26/20 06:53 Dose:  100 mls/hr


Sodium Chloride (Normal Saline) Confirm Administered Dose 100 mls @ as directed 

.ROUTE .STK-MED ONE


   Stop: 04/26/20 06:33


   Last Admin: 04/26/20 06:40 Dose:  Not Given


Sodium Chloride (Normal Saline) Confirm Administered Dose 100 mls @ as directed 

.ROUTE .STK-MED ONE


   Stop: 04/26/20 06:34


   Last Admin: 04/26/20 06:40 Dose:  Not Given


Sodium Chloride (Normal Saline)  1,000 mls @ 500 mls/hr IV ASDIRECTED [facility]


   Last Admin: 04/26/20 07:36 Dose:  500 mls/hr


Lactated Ringer's (Ringers, Lactated) Confirm Administered Dose 1,000 mls @ as 

directed .ROUTE .ST-MED ONE


   Stop: 04/26/20 09:01


Sodium Chloride (Normal Saline) Confirm Administered Dose 10 mls @ as directed 

.ROUTE .STK-MED ONE


   Stop: 04/26/20 09:05


Lactated Ringer's (Ringers, Lactated) Confirm Administered Dose 1,000 mls @ as 

directed .ROUTE .[facility]-MED ONE


   Stop: 04/26/20 09:08


Lactated Ringer's (Ringers, Lactated) Confirm Administered Dose 1,000 mls @ as 

directed [facility]


   Stop: 04/26/20 09:38


Piperacillin/Tazobactam/ (Dextrose 3.375 gm/ Premix)  50 mls @ 100 mls/hr IV 

Q6H [facility]


   Last Admin: 04/28/20 07:56 Dose:  100 mls/hr


Potassium Cl/Dextrose/Lact Ringer's (D5 Lr With 20 Meq Kcl)  1,000 mls @ 125 mls

/hr IV ASDIRECTED [facility]


   Last Admin: 04/26/20 21:54 Dose:  125 mls/hr


Vancomycin HCl 1 gm/ Sodium (Chloride)  250 mls @ 166.667 mls/hr IV Q12H [facility]


   Last Admin: 04/28/20 00:19 Dose:  166.667 mls/hr


Norepinephrine Bitartrate 4 mg (/ Dextrose/Water)  250 mls @ 7.5 mls/hr IV 

TITRATE [facility]; Protocol


   Last Titration: 04/28/20 02:00 Dose:  0 mcg/min, 0 mls/hr


Potassium Cl/Dextrose/Lact Ringer's (D5 Lr With 20 Meq Kcl)  1,000 mls @ 75 mls/

hr IV ASDIRECTED [facility]


   Last Admin: 04/27/20 18:42 Dose:  75 mls/hr


Magnesium Sulfate 2 gm/ Premix  50 mls @ 25 mls/hr IV Q6H [facility]


   Stop: 04/28/20 17:59


   Last Admin: 04/28/20 16:02 Dose:  25 mls/hr


Dextrose/Lactated Ringer's (Dextrose 5%-Lactated Ringers)  1,000 mls @ 150 mls/

hr IV ASDIRECTED [facility]


   Last Admin: 04/29/20 07:23 Dose:  150 mls/hr


Potassium Chloride 20 meq/Lidocaine HCl 2 ml/ Sodium Chloride  112 mls @ 56 mls/

hr IV Q2H [facility]


   Stop: 04/28/20 17:59


   Last Admin: 04/28/20 16:57 Dose:  56 mls/hr


Ceftazidime 1 gm/ Sodium (Chloride)  50 mls @ 100 mls/hr IV Q8HR [facility]


   Last Admin: 04/28/20 13:58 Dose:  100 mls/hr


Lactated Ringer's (Ringers, Lactated)  1,000 mls @ 500 mls/hr IV ASDIRECTED [facility]


   Stop: 04/28/20 15:59


   Last Admin: 04/28/20 14:03 Dose:  500 mls/hr


Dextrose/Lactated Ringer's (Dextrose 5%-Lactated Ringers)  1,000 mls @ 75 mls/

hr IV ASDIRECTED [facility]


   Last Admin: 04/30/20 22:55 Dose:  75 mls/hr


Potassium Chloride 20 meq/Lidocaine HCl 2 ml/ Sodium Chloride  112 mls @ 56 mls/

hr IV Q2H [facility]


   Stop: 04/30/20 13:59


   Last Admin: 04/30/20 12:01 Dose:  56 mls/hr


Lactated Ringer's (Ringers, Lactated)  1,000 ml IRR .STK-MED ONE


   Stop: 04/26/20 09:16


   Last Admin: 04/26/20 09:15 Dose:  1,000 ml


Lidocaine/Epinephrine (Xylocaine 1% With Epinephrine 1:100,000) Confirm 

Administered Dose 50 ml .ROUTE .STK-MED ONE


   Stop: 04/26/20 08:35


   Last Admin: 04/26/20 09:29 Dose:  20 ml


Neostigmine Methylsulfate (Neostigmine) Confirm Administered Dose 5 mg .ROUTE 

.STK-MED ONE


   Stop: 04/26/20 08:42


Ondansetron HCl (Zofran) Confirm Administered Dose 4 mg .ROUTE .STK-MED ONE


   Stop: 04/26/20 08:42


Pantoprazole Sodium (Protonix***)  40 mg PO ACBREAKFAST [facility]


   Last Admin: 04/27/20 08:20 Dose:  40 mg


Phenylephrine HCl (Niraj-Synephrine) Confirm Administered Dose 10 mg .ROUTE .STK-

MED ONE


   Stop: 04/26/20 09:05


Piperacillin Sod/Tazobactam Sod (Zosyn) Confirm Administered Dose 4.5 gm .ROUTE 

.STK-MED ONE


   Stop: 04/26/20 06:31


   Last Admin: 04/26/20 06:39 Dose:  Not Given


Propofol (Diprivan  20 Ml) Confirm Administered Dose 200 mg .ROUTE .ST[facility]-MED ONE


   Stop: 04/26/20 08:42


Rocuronium Bromide (Zemuron) Confirm Administered Dose 50 mg .ROUTE .STK-MED ONE


   Stop: 04/26/20 08:42


Succinylcholine Chloride (Quelicin) Confirm Administered Dose 200 mg .ROUTE .STK

-MED ONE


   Stop: 04/26/20 08:42











- Exam


Quality Assessment: Supplemental Oxygen, Urine Catheter, DVT Prophylaxis


General: Alert, Cooperative, Lethargic


Lungs: Clear to Auscultation, Normal Respiratory Effort


Cardiovascular: Regular Rate, Regular Rhythm, No Murmurs


GI/Abdominal Exam: Soft, No Organomegaly, Tender.  No: Distended, Guarding, 

Rigid, Rebound


Extremities: Non-Tender, No Pedal Edema





Sepsis Event Note





- Evaluation


Sepsis Screening Result: No Definite Risk





- Focused Exam


Vital Signs: 


 Vital Signs











  Temp Pulse Resp BP Pulse Ox


 


 05/01/20 10:00    16  125/71  97


 


 05/01/20 08:00  96.7 F L   20  115/68  96


 


 05/01/20 06:00   103 H  22 H  107/66  91 L


 


 05/01/20 04:00  98.5 F  111 H  23 H  115/70  93 L


 


 05/01/20 02:00  98.8 F  120 H  14  104/70  93 L











Date Exam was Performed: 05/01/20


Time Exam was Performed: 12:00





- Problem List Review


Problem List Initiated/Reviewed/Updated: Yes





- My Orders


Last 24 Hours: 


My Active Orders





04/30/20 Lunch


GI Soft Low Fiber [Soft Diet] [DIET] 





05/01/20 11:51


Remove Urinary Catheter [Urinary Catheter Removal] [RC] Per Unit Routine 





05/01/20 11:59


Convert IV to Saline Lock [OM.] Routine 














- Plan


Plan:: 





ASSESSMENT AND PLAN  





Acute cholecystitis with septic shock-improvement over the past few days, oral 

intake slowly improving


-Continue levofloxacin and Zosyn, with possible underlying aspiration


-Saline lock IV


-Nutritional supplement 4 times daily





Aspiration pneumonia-episode of hypoxia, chest x-ray shows evidence of 

bilateral infiltrates versus atelectasis.  Respiratory status is stabilized 

over the past few days, continues to require low level of supplemental oxygen


-Antibiotic therapy as above





Hypokalemia


-Continue to monitor





Down syndrome-significant disability at this time.  Difficulty with 

communication.


-Continue home medications





Maintenance issues - 


- DVT prophylaxis -mechanical today, enoxaparin started tomorrow


- Nutrition -advance per surgical instructions


- Maldonado catheter -placed in the emergency room for strict intake and output 

monitoring in a critically ill patient





Admission justification -this patient will be admitted for inpatient services 

and is medically appropriate meeting medical necessity for inpatient admission 

as outlined in my documentation.  I reasonably expect the patient will require 

inpatient services that span a period time over 2 midnights. I reasonably 

expect this patient to be discharged or transferred within 96 hours [facility]

## 2020-05-02 RX ADMIN — AMOXICILLIN AND CLAVULANATE POTASSIUM SCH TAB: 875; 125 TABLET, FILM COATED ORAL at 17:26

## 2020-05-02 RX ADMIN — POTASSIUM CHLORIDE SCH MEQ: 750 CAPSULE, EXTENDED RELEASE ORAL at 08:16

## 2020-05-02 RX ADMIN — TAZOBACTAM SODIUM AND PIPERACILLIN SODIUM SCH MLS/HR: 375; 3 INJECTION, SOLUTION INTRAVENOUS at 09:16

## 2020-05-02 RX ADMIN — PANTOPRAZOLE SODIUM SCH MG: 40 GRANULE, DELAYED RELEASE ORAL at 08:09

## 2020-05-02 RX ADMIN — Medication SCH CAP: at 08:16

## 2020-05-02 RX ADMIN — Medication SCH: at 20:05

## 2020-05-02 RX ADMIN — Medication SCH CAP: at 19:54

## 2020-05-02 RX ADMIN — TAZOBACTAM SODIUM AND PIPERACILLIN SODIUM SCH MLS/HR: 375; 3 INJECTION, SOLUTION INTRAVENOUS at 00:59

## 2020-05-02 RX ADMIN — HYDROCODONE BITARTRATE AND ACETAMINOPHEN PRN TAB: 5; 325 TABLET ORAL at 08:13

## 2020-05-02 RX ADMIN — HYDROCODONE BITARTRATE AND ACETAMINOPHEN PRN TAB: 5; 325 TABLET ORAL at 19:55

## 2020-05-02 NOTE — PCM.PN
- General Info


Date of Service: 05/02/20


Subjective Update: 





Mr. Reddy has been stable since yesterday, vital signs have been good and 

he has remained afebrile.  Respiratory status seems to be fairly good and his 

oral intake has improved.  He is unable to provide a meaningful history 

concerning symptoms or review of systems because of his underlying Down 

syndrome and associated dementia.





- Patient Data


Vitals - Most Recent: 


 Last Vital Signs











Temp  98.4 F   05/02/20 07:00


 


Pulse  110 H  05/02/20 07:00


 


Resp  20   05/02/20 07:00


 


BP  93/81   05/02/20 08:16


 


Pulse Ox  91 L  05/02/20 07:00











Weight - Most Recent: 160 lb


I&O - Last 24 Hours: 


 Intake & Output











 05/01/20 05/02/20 05/02/20





 22:59 06:59 14:59


 


Intake Total 100  


 


Output Total 300 300 


 


Balance -200 -300 











Med Orders - Current: 


 Current Medications





Hydrocodone Bitart/Acetaminophen (Norco 325-5 Mg)  1 tab PO Q4H PRN


   PRN Reason: Pain (moderate 4-6)


   Last Admin: 05/02/20 08:13 Dose:  1 tab


Albuterol/Ipratropium (Duoneb 3.0-0.5 Mg/3 Ml)  3 ml NEB Q4H PRN


   PRN Reason: Dyspnea


   Last Admin: 05/01/20 23:47 Dose:  3 ml


Benzocaine/Menthol (Cepacol Sore Throat)  1 lozenge MUCMEM Q4H PRN


   PRN Reason: Sore Throat


Docusate Sodium (Colace)  100 mg PO BID PRN


   PRN Reason: Constipation


Donepezil HCl (Aricept)  10 mg PO DAILY [facility]


   Last Admin: 05/02/20 08:15 Dose:  10 mg


Enoxaparin Sodium (Lovenox)  40 mg SUBCUT DAILY [facility]


   Last Admin: 05/02/20 08:15 Dose:  40 mg


Hydrocortisone (Hydrocortisone 1% Crm)  0 gm TOP BID PRN


   PRN Reason: Rash


Hydroxyzine HCl (Vistaril)  100 mg IM Q4H PRN


   PRN Reason: Breakthrough Pain


Levofloxacin/Dextrose 750 mg/ (Premix)  150 mls @ 100 mls/hr IV Q24H [facility]


   Last Admin: 05/01/20 16:48 Dose:  100 mls/hr


Piperacillin/Tazobactam/ (Dextrose 3.375 gm/ Premix)  50 mls @ 100 mls/hr IV 

Q6H [facility]


   Last Admin: 05/02/20 09:16 Dose:  100 mls/hr


Lactobacillus Rhamnosus (Culturelle)  1 cap PO BID [facility]


   Last Admin: 05/02/20 08:16 Dose:  1 cap


Levetiracetam (Keppra)  750 mg PO BID [facility]


   Last Admin: 05/02/20 08:14 Dose:  750 mg


Magnesium Oxide (Magnesium Oxide)  400 mg PO BID [facility]


   Last Admin: 05/02/20 08:16 Dose:  400 mg


Ondansetron HCl (Zofran Odt)  4 mg PO Q4H PRN


   PRN Reason: Nausea/Vomiting


Ondansetron HCl (Zofran)  4 mg IVPUSH Q4H PRN


   PRN Reason: Nausea/Vomiting


Oxcarbazepine (Trileptal)  150 mg PO BID [facility]


   Last Admin: 05/02/20 08:15 Dose:  150 mg


Pantoprazole Sodium (Protonix Granules***)  40 mg PO DAILY@0730 [facility]


   Last Admin: 05/02/20 08:09 Dose:  40 mg


Potassium Chloride (Potassium Chloride)  10 meq PO DAILY [facility]


   Last Admin: 05/02/20 08:16 Dose:  10 meq


Quetiapine Fumarate (Seroquel)  50 mg PO TID [facility]


   Last Admin: 05/02/20 08:15 Dose:  50 mg


Sertraline HCl (Zoloft)  50 mg PO DAILY [facility]


   Last Admin: 05/02/20 08:15 Dose:  50 mg


Zolpidem Tartrate (Ambien)  5 mg PO BEDTIME PRN


   PRN Reason: Sleep





Discontinued Medications





Acetaminophen (Tylenol)  650 mg RECTAL NOW ONE


   Stop: 04/26/20 05:59


   Last Admin: 04/26/20 06:03 Dose:  650 mg


Bupivacaine HCl (Marcaine 0.5%) Confirm Administered Dose 50 ml .ROUTE .ST[facility]-MED 

ONE


   Stop: 04/26/20 08:35


   Last Admin: 04/26/20 09:29 Dose:  20 ml


Ropivacaine 36 ml/Dexamethasone 8 mg/Epinephrine HCl 0.4 mg/ Sodium Chloride 

41.6 ml  0 ml NERVRT ASDIRECTED [facility]


   Last Admin: 04/26/20 09:35 Dose:  80 syringe


Dexamethasone (Dexamethasone) Confirm Administered Dose 4 mg .ROUTE .[facility]-MED ONE


   Stop: 04/26/20 08:42


Enoxaparin Sodium (Lovenox)  40 mg SUBCUT DAILY [facility]


   Last Admin: 04/28/20 08:54 Dose:  40 mg


Fentanyl (Sublimaze) Confirm Administered Dose 250 mcg .ROUTE .ST[facility]-MED ONE


   Stop: 04/26/20 08:42


Glycopyrrolate (Robinul) Confirm Administered Dose 1 mg .ROUTE .[facility]-MED ONE


   Stop: 04/26/20 08:42


Sodium Chloride (Normal Saline)  1,000 mls @ 999 [facility]


   Last Admin: 04/26/20 05:40 Dose:  999 mls/hr


Sodium Chloride (Normal Saline)  1,000 mls [facility] 999 [facility]


   Last Admin: 04/26/20 06:30 Dose:  999 mls/hr


Piperacillin Sod/Tazobactam (Sod 4.5 gm/ Sodium Chloride)  100 mls @ 100 mls/hr 

IV ONETIME ONE


   Stop: 04/26/20 07:16


   Last Admin: 04/26/20 06:35 Dose:  100 mls/hr


Aztreonam 1 gm/ Sodium (Chloride)  50 mls @ 100 mls/hr IV ONETIME ONE


   Stop: 04/26/20 07:01


   Last Admin: 04/26/20 06:53 Dose:  100 mls/hr


Sodium Chloride (Normal Saline) Confirm Administered Dose 100 mls @ as directed 

.ROUTE .ST-MED ONE


   Stop: 04/26/20 06:33


   Last Admin: 04/26/20 06:40 Dose:  Not Given


Sodium Chloride (Normal Saline) Confirm Administered Dose 100 mls @ as directed 

.ROUTE .[facility]-MED ONE


   Stop: 04/26/20 06:34


   Last Admin: 04/26/20 06:40 Dose:  Not Given


Sodium Chloride (Normal Saline)  1,000 mls @ 500 mls/hr IV [facility]


   Last Admin: 04/26/20 07:36 Dose:  500 mls/hr


Lactated Ringer's (Ringers, Lactated) Confirm Administered Dose 1,000 mls @ as 

directed .ROUTE .[facility]-MED ONE


   Stop: 04/26/20 09:01


Sodium Chloride (Normal Saline) Confirm Administered Dose 10 mls @ as directed 

.ROUTE .[facility]-MED ONE


   Stop: 04/26/20 09:05


Lactated Ringer's (Ringers, Lactated) Confirm Administered Dose 1,000 mls @ as 

directed .ROUTE .[facility]-MED ONE


   Stop: 04/26/20 09:08


Lactated Ringer's (Ringers, Lactated) Confirm Administered Dose 1,000 mls @ as 

directed .ROUTE .[facility]-MED ONE


   Stop: 04/26/20 09:38


Piperacillin/Tazobactam/ (Dextrose 3.375 gm/ Premix)  50 mls @ 100 mls/hr IV 

Q6H [facility]


   Last Admin: 04/28/20 07:56 Dose:  100 mls/hr


Potassium Cl/Dextrose/Lact Ringer's (D5 Lr With 20 Meq Kcl)  1,000 mls @ 125 mls

/hr IV ASDIRECTED [facility]


   Last Admin: 04/26/20 21:54 Dose:  125 mls/hr


Vancomycin HCl 1 gm/ Sodium (Chloride)  250 mls @ 166.667 mls/hr IV Q12H [facility]


   Last Admin: 04/28/20 00:19 Dose:  166.667 mls/hr


Norepinephrine Bitartrate 4 mg (/ Dextrose/Water)  250 mls @ 7.5 mls/hr IV 

TITRATE [facility]; Protocol


   Last Titration: 04/28/20 02:00 Dose:  0 mcg/min, 0 mls/hr


Potassium Cl/Dextrose/Lact Ringer's (D5 Lr With 20 Meq Kcl)  1,000 mls @ 75 mls/

hr IV ASDIRECTED [facility]


   Last Admin: 04/27/20 18:42 Dose:  75 mls/hr


Magnesium Sulfate 2 gm/ Premix  50 mls @ 25 mls/hr IV Q6H [facility]


   Stop: 04/28/20 17:59


   Last Admin: 04/28/20 16:02 Dose:  25 mls/hr


Dextrose/Lactated Ringer's (Dextrose 5%-Lactated Ringers)  1,000 mls @ 150 mls/

hr IV ASDIRECTED [facility]


   Last Admin: 04/29/20 07:23 Dose:  150 mls/hr


Potassium Chloride 20 meq/Lidocaine HCl 2 ml/ Sodium Chloride  112 mls @ 56 mls/

hr IV Q2H [facility]


   Stop: 04/28/20 17:59


   Last Admin: 04/28/20 16:57 Dose:  56 mls/hr


Ceftazidime 1 gm/ Sodium (Chloride)  50 mls @ 100 mls/hr IV Q8HR [facility]


   Last Admin: 04/28/20 13:58 Dose:  100 mls/hr


Lactated Ringer's (Ringers, Lactated)  1,000 mls @ 500 mls/hr IV ASDIRECTED [facility]


   Stop: 04/28/20 15:59


   Last Admin: 04/28/20 14:03 Dose:  500 mls/hr


Dextrose/Lactated Ringer's (Dextrose 5%-Lactated Ringers)  1,000 mls @ 75 mls/

hr IV ASDIRECTED [facility]


   Last Admin: 04/30/20 22:55 Dose:  75 mls/hr


Potassium Chloride 20 meq/Lidocaine HCl 2 ml/ Sodium Chloride  112 mls @ 56 mls/

hr IV Q2H [facility]


   Stop: 04/30/20 13:59


   Last Admin: 04/30/20 12:01 Dose:  56 mls/hr


Sodium Chloride (Normal Saline)  250 mls @ 250 mls/hr IV ONETIME ONE


   Stop: 05/02/20 00:27


   Last Admin: 05/01/20 23:50 Dose:  250 mls/hr


Lactated Ringer's (Ringers, Lactated)  1,000 ml IRR .STK-MED ONE


   Stop: 04/26/20 09:16


   Last Admin: 04/26/20 09:15 Dose:  1,000 ml


Lidocaine HCl (Xylocaine 2% Jelly)  10 ml MUCMEM ONETIME ONE


   Stop: 05/02/20 05:53


   Last Admin: 05/02/20 06:13 Dose:  10 ml


Lidocaine/Epinephrine (Xylocaine 1% With Epinephrine 1:100,000) Confirm 

Administered Dose 50 ml .ROUTE .STK-MED ONE


   Stop: 04/26/20 08:35


   Last Admin: 04/26/20 09:29 Dose:  20 ml


Neostigmine Methylsulfate (Neostigmine) Confirm Administered Dose 5 mg .ROUTE 

.STK-MED ONE


   Stop: 04/26/20 08:42


Ondansetron HCl (Zofran) Confirm Administered Dose 4 mg .ROUTE .STK-MED ONE


   Stop: 04/26/20 08:42


Pantoprazole Sodium (Protonix***)  40 mg PO ACBREAKFAST [facility]


   Last Admin: 04/27/20 08:20 Dose:  40 mg


Phenylephrine HCl (Niraj-Synephrine) Confirm Administered Dose 10 mg .ROUTE .STK-

MED ONE


   Stop: 04/26/20 09:05


Piperacillin Sod/Tazobactam Sod (Zosyn) Confirm Administered Dose 4.5 gm .ROUTE 

.STK-MED ONE


   Stop: 04/26/20 06:31


   Last Admin: 04/26/20 06:39 Dose:  Not Given


Propofol (Diprivan  20 Ml) Confirm Administered Dose 200 mg .ROUTE .STK-MED ONE


   Stop: 04/26/20 08:42


Rocuronium Bromide (Zemuron) Confirm Administered Dose 50 mg .ROUTE .STK-MED ONE


   Stop: 04/26/20 08:42


Succinylcholine Chloride (Quelicin) Confirm Administered Dose 200 mg .ROUTE .STK

-MED ONE


   Stop: 04/26/20 08:42











- Exam


Quality Assessment: DVT Prophylaxis


General: Cooperative, No Acute Distress, Lethargic.  No: Oriented


Lungs: Clear to Auscultation, Normal Respiratory Effort


Cardiovascular: Regular Rate, Regular Rhythm, No Murmurs


GI/Abdominal Exam: Soft, Non-Tender, No Organomegaly, No Distention


Extremities: Non-Tender, No Pedal Edema





Sepsis Event Note





- Evaluation


Sepsis Screening Result: No Definite Risk





- Focused Exam


Vital Signs: 


 Vital Signs











  Temp Pulse Resp BP BP Pulse Ox


 


 05/02/20 08:16      93/81 


 


 05/02/20 07:00  98.4 F  110 H  20  87/46 L   91 L


 


 05/02/20 03:00  99 F  91  18  102/51 L   91 L


 


 05/01/20 23:00  97.8 F  111 H  16  102/63   93 L











Date Exam was Performed: 05/02/20


Time Exam was Performed: 10:34





- Problem List Review


Problem List Initiated/Reviewed/Updated: Yes





- My Orders


Last 24 Hours: 


My Active Orders





05/01/20 11:59


Convert IV to Saline Lock [OM.PC] Routine 





05/01/20 12:21


Patient Status [ADT] Routine 














- Plan


Plan:: 





ASSESSMENT AND PLAN  





Acute cholecystitis with septic shock-status post laparoscopic cholecystectomy, 

oral intake is been much better over the past 24 hours


-Discontinue IV antibiotic therapy


-Augmentin 875 mg p.o. every 12 hours


-Saline lock IV


-Nutritional supplement 4 times daily





Aspiration pneumonia-episode of hypoxia, chest x-ray shows evidence of 

bilateral infiltrates versus atelectasis.  Respiratory status is stabilized.


-Antibiotic therapy as above





Hypokalemia


-Continue to monitor





Down syndrome-significant disability at this time.  Difficulty with 

communication.


-Continue home medications





Maintenance issues - 


- DVT prophylaxis -mechanical today, enoxaparin started tomorrow


- Nutrition -advance per surgical instructions


- Maldonado catheter -placed in the emergency room for strict intake and output 

monitoring in a critically ill patient





Admission justification -this patient will be admitted for inpatient services 

and is medically appropriate meeting medical necessity for inpatient admission 

as outlined in my documentation.  I reasonably expect the patient will require 

inpatient services that span a period time over 2 midnights. I reasonably 

expect this patient to be discharged or transferred within 96 hours [facility] 

[facility]





Disposition-anticipate discharge back to assisted living facility tomorrow

## 2020-05-02 NOTE — PCM.[email or web address]
- Free Text/Narrative


Note: 





time: 23:14 call from 2 [facility]. concerns of increased respiratory rate 

and fluid overload. has been given nebulizer treatment without much improvement





O) vital signs [id number] Oxygen sat low 90's on 4 liters. increased work of 

respiration, expiratory wheezes


    has had 2 liter fluid bolus and now 3 rd liter of  IV Normal saline 125ml/hr





A) labor resp. rate, concerns for fluid overload





P) discontinue IV fluids and saline lock. 


    give IV Lasix 20 mg IVP now


    monitor vital signs.

## 2020-05-03 RX ADMIN — TAZOBACTAM SODIUM AND PIPERACILLIN SODIUM SCH MLS/HR: 375; 3 INJECTION, SOLUTION INTRAVENOUS at 10:47

## 2020-05-03 RX ADMIN — Medication SCH CAP: at 20:49

## 2020-05-03 RX ADMIN — SODIUM CHLORIDE SCH MLS/HR: 9 INJECTION, SOLUTION INTRAVENOUS at 16:57

## 2020-05-03 RX ADMIN — PANTOPRAZOLE SODIUM SCH MG: 40 GRANULE, DELAYED RELEASE ORAL at 08:05

## 2020-05-03 RX ADMIN — HYDROCODONE BITARTRATE AND ACETAMINOPHEN PRN TAB: 5; 325 TABLET ORAL at 07:50

## 2020-05-03 RX ADMIN — TAZOBACTAM SODIUM AND PIPERACILLIN SODIUM SCH MLS/HR: 375; 3 INJECTION, SOLUTION INTRAVENOUS at 16:55

## 2020-05-03 RX ADMIN — TAZOBACTAM SODIUM AND PIPERACILLIN SODIUM SCH MLS/HR: 375; 3 INJECTION, SOLUTION INTRAVENOUS at 21:01

## 2020-05-03 RX ADMIN — SODIUM CHLORIDE SCH MLS/HR: 9 INJECTION, SOLUTION INTRAVENOUS at 14:03

## 2020-05-03 RX ADMIN — LEVOFLOXACIN SCH MLS/HR: 750 INJECTION, SOLUTION INTRAVENOUS at 09:20

## 2020-05-03 RX ADMIN — HYDROCODONE BITARTRATE AND ACETAMINOPHEN PRN TAB: 5; 325 TABLET ORAL at 13:46

## 2020-05-03 RX ADMIN — HYDROCODONE BITARTRATE AND ACETAMINOPHEN PRN TAB: 5; 325 TABLET ORAL at 22:14

## 2020-05-03 RX ADMIN — POTASSIUM CHLORIDE SCH MEQ: 750 CAPSULE, EXTENDED RELEASE ORAL at 08:05

## 2020-05-03 RX ADMIN — AMOXICILLIN AND CLAVULANATE POTASSIUM SCH TAB: 875; 125 TABLET, FILM COATED ORAL at 08:04

## 2020-05-03 RX ADMIN — Medication SCH CAP: at 08:03

## 2020-05-03 NOTE — PCM.PN
- General Info


Date of Service: 05/03/20


Subjective Update: 





Mr. Reyes unfortunately continued to experience some hypotension through 

the day yesterday and was given several fluid boluses.  In the evening he 

developed more hypoxia was felt to be somewhat fluid overloaded and did receive 

IV furosemide.  Blood pressure has been more stable but he is required 

increased level of supplemental oxygen.  White blood cell count this morning is 

increased, previously had been normal.  Chest x-ray this morning shows evidence 

of large infiltrate in the right lung.  Because of his underlying Down syndrome 

and associated dementia he is unable to provide a meaningful history concerning 

recent symptoms or review of systems.





- Patient Data


Vitals - Most Recent: 


 Last Vital Signs











Temp  98.3 F   05/03/20 07:00


 


Pulse  110 H  05/03/20 07:00


 


Resp  22 H  05/03/20 07:00


 


BP  109/53 L  05/03/20 07:00


 


Pulse Ox  84 L  05/03/20 07:00











Weight - Most Recent: 160 lb


I&O - Last 24 Hours: 


 Intake & Output











 05/02/20 05/03/20 05/03/20





 22:59 06:59 14:59


 


Intake Total 723 460 


 


Balance 723 460 











Lab Results Last 24 Hours: 


 Laboratory Results - last 24 hr











  05/03/20 05/03/20 Range/Units





  04:30 04:30 


 


WBC  17.8 H   (4.5-11.0)  K/uL


 


RBC  3.07 L   (4.30-5.90)  M/uL


 


Hgb  9.7 L   (12.0-15.0)  g/dL


 


Hct  31.0 L   (40.0-54.0)  %


 


MCV  101 H   (80-98)  fL


 


MCH  32 H   (27-31)  pg


 


MCHC  31 L   (32-36)  %


 


Plt Count  227   (150-400)  K/uL


 


Add Manual Diff  Yes   


 


Neutrophils % (Manual)  91 H   (36-66)  %


 


Band Neutrophils %  1 L   (5-11)  %


 


Lymphocytes % (Manual)  4 L   (24-44)  %


 


Monocytes % (Manual)  4   (2-6)  %


 


Sodium   143  (140-148)  mmol/L


 


Potassium   3.3 L  (3.6-5.2)  mmol/L


 


Chloride   107  (100-108)  mmol/L


 


Carbon Dioxide   32  (21-32)  mmol/L


 


Anion Gap   7.3  (5.0-14.0)  mmol/L


 


BUN   4 L  (7-18)  mg/dL


 


Creatinine   0.8  (0.8-1.3)  mg/dL


 


Est Cr Clr Drug Dosing   96.19  mL/min


 


Estimated GFR (MDRD)   > 60  (>60)  


 


Glucose   101  ()  mg/dL


 


Calcium   7.6 L  (8.5-10.1)  mg/dL











Med Orders - Current: 


 Current Medications





Hydrocodone Bitart/Acetaminophen (Norco 325-5 Mg)  1 tab PO Q4H PRN


   PRN Reason: Pain (moderate 4-6)


   Last Admin: 05/03/20 07:50 Dose:  1 tab


Albuterol/Ipratropium (Duoneb 3.0-0.5 Mg/3 Ml)  3 ml NEB Q4H PRN


   PRN Reason: Dyspnea


   Last Admin: 05/02/20 22:42 Dose:  3 ml


Benzocaine/Menthol (Cepacol Sore Throat)  1 lozenge MUCMEM Q4H PRN


   PRN Reason: Sore Throat


Docusate Sodium (Colace)  100 mg PO BID PRN


   PRN Reason: Constipation


Donepezil HCl (Aricept)  10 mg PO DAILY [facility]


   Last Admin: 05/03/20 08:03 Dose:  10 mg


Enoxaparin Sodium (Lovenox)  40 mg SUBCUT DAILY [facility]


   Last Admin: 05/03/20 08:04 Dose:  40 mg


Hydrocortisone (Hydrocortisone 1% Crm)  0 gm TOP BID PRN


   PRN Reason: Rash


Hydroxyzine HCl (Vistaril)  100 mg IM Q4H PRN


   PRN Reason: Breakthrough Pain


Levofloxacin/Dextrose 750 mg/ (Premix)  150 mls @ 100 mls/hr IV Q24H [facility]


   Last Admin: 05/03/20 09:20 Dose:  100 mls/hr


Piperacillin/Tazobactam/ (Dextrose 3.375 gm/ Premix)  50 mls @ 100 mls/hr IV 

Q6HR [facility]


Vancomycin HCl 1 gm/ Sodium (Chloride)  250 mls @ 167 mls/hr IV Q12H [facility]


Lactobacillus Rhamnosus (Culturelle)  1 cap PO BID [facility]


   Last Admin: 05/03/20 08:03 Dose:  1 cap


Levetiracetam (Keppra)  750 mg PO BID [facility]


   Last Admin: 05/03/20 08:04 Dose:  750 mg


Lidocaine HCl (Xylocaine 2% Jelly)  10 ml MUCMEM BID PRN


   PRN Reason: Other


Magnesium Oxide (Magnesium Oxide)  400 mg PO BID [facility]


   Last Admin: 05/03/20 08:04 Dose:  400 mg


Ondansetron HCl (Zofran Odt)  4 mg PO Q4H PRN


   PRN Reason: Nausea/Vomiting


Ondansetron HCl (Zofran)  4 mg IVPUSH Q4H PRN


   PRN Reason: Nausea/Vomiting


Oxcarbazepine (Trileptal)  150 mg PO BID [facility]


   Last Admin: 05/03/20 08:06 Dose:  150 mg


Pantoprazole Sodium (Protonix Granules***)  40 mg PO DAILY@0730 [facility]


   Last Admin: 05/03/20 08:05 Dose:  40 mg


Potassium Chloride (Potassium Chloride)  10 meq PO DAILY [facility]


   Last Admin: 05/03/20 08:05 Dose:  10 meq


Quetiapine Fumarate (Seroquel)  50 mg PO TID [facility]


   Last Admin: 05/03/20 08:03 Dose:  50 mg


Sertraline HCl (Zoloft)  50 mg PO DAILY [facility]


   Last Admin: 05/03/20 08:06 Dose:  50 mg


Zolpidem Tartrate (Ambien)  5 mg PO BEDTIME PRN


   PRN Reason: Sleep





Discontinued Medications





Acetaminophen (Tylenol)  650 mg RECTAL NOW ONE


   Stop: 04/26/20 05:59


   Last Admin: 04/26/20 06:03 Dose:  650 mg


Amoxicillin/Clavulanate Potassium (Augmentin 875 Mg/125 Mg)  1 tab PO BID [facility]


   Last Admin: 05/03/20 08:04 Dose:  1 tab


Bupivacaine HCl (Marcaine 0.5%) Confirm Administered Dose 50 ml .ROUTE .STK-MED 

ONE


   Stop: 04/26/20 08:35


   Last Admin: 04/26/20 09:29 Dose:  20 ml


Ropivacaine 36 ml/Dexamethasone 8 mg/Epinephrine HCl 0.4 mg/ Sodium Chloride 

41.6 ml  0 ml NERVRT ASDIRECTED [facility]


   Last Admin: 04/26/20 09:35 Dose:  80 syringe


Dexamethasone (Dexamethasone) Confirm Administered Dose 4 mg .ROUTE .STK-MED ONE


   Stop: 04/26/20 08:42


Enoxaparin Sodium (Lovenox)  40 mg SUBCUT DAILY [facility]


   Last Admin: 04/28/20 08:54 Dose:  40 mg


Fentanyl (Sublimaze) Confirm Administered Dose 250 mcg .ROUTE .STK-MED ONE


   Stop: 04/26/20 08:42


Furosemide (Lasix)  20 mg IVPUSH ONETIME ONE


   Stop: 05/02/20 23:16


   Last Admin: 05/02/20 23:54 Dose:  20 mg


Glycopyrrolate (Robinul) Confirm Administered Dose 1 mg .ROUTE .STK-MED ONE


   Stop: 04/26/20 08:42


Sodium Chloride (Normal Saline)  [facility]000 [facility]ls [facility]


   Last Admin: 04/26/20 05:40 Dose:  999 mls/hr


Sodium Chloride (Normal Saline)  [facility]000 [facility]


   Last Admin: 04/26/20 06:30 Dose:  999 mls/hr


Piperacillin Sod/Tazobactam (Sod 4.5 gm/ Sodium Chloride)  100 mls @ 100 mls/hr 

IV ONETIME ONE


   Stop: 04/26/20 07:16


   Last Admin: 04/26/20 06:35 Dose:  100 mls/hr


Aztreonam 1 gm/ Sodium (Chloride)  50 mls @ 100 mls/hr IV ONETIME ONE


   Stop: 04/26/20 07:01


   Last Admin: 04/26/20 06:53 Dose:  100 mls/hr


Sodium Chloride (Normal Saline) Confirm Administered Dose 100 mls @ as directed 

.ROUTE .STK-MED ONE


   Stop: 04/26/20 06:33


   Last Admin: 04/26/20 06:40 Dose:  Not Given


Sodium Chloride (Normal Saline) Confirm Administered Dose 100 mls @ as directed 

.ROUTE .STK-MED ONE


   Stop: 04/26/20 06:34


   Last Admin: 04/26/20 06:40 Dose:  Not Given


Sodium Chloride (Normal Saline)  1,000 mls [facility]


   Last Admin: 04/26/20 07:36 Dose:  500 mls/hr


Lactated Ringer's (Ringers, Lactated) Confirm Administered Dose 1,000 mls @ as 

directed .ROUTE .STK-MED ONE


   Stop: 04/26/20 09:01


Sodium Chloride (Normal Saline) Confirm Administered Dose 10 mls @ as directed 

.ROUTE .STK-MED ONE


   Stop: 04/26/20 09:05


Lactated Ringer's (Ringers, Lactated) Confirm Administered Dose 1,000 mls @ as 

directed .ROUTE .STK-MED ONE


   Stop: 04/26/20 09:08


Lactated Ringer's (Ringers, Lactated) Confirm Administered Dose 1,000 mls @ as 

directed .ROUTE .STK-MED ONE


   Stop: 04/26/20 09:38


Piperacillin/Tazobactam/ (Dextrose 3.375 gm/ Premix)  50 mls @ 100 mls/hr IV 

Q6H [facility]


   Last Admin: 04/28/20 07:56 Dose:  100 mls/hr


Potassium Cl/Dextrose/Lact Ringer's (D5 Lr With 20 Meq Kcl)  1,000 mls @ 125 mls

/hr IV ASDIRECTED [facility]


   Last Admin: 04/26/20 21:54 Dose:  125 mls/hr


Vancomycin HCl 1 gm/ Sodium (Chloride)  250 mls @ 166.667 mls/hr IV Q12H [facility]


   Last Admin: 04/28/20 00:19 Dose:  166.667 mls/hr


Norepinephrine Bitartrate 4 mg (/ Dextrose/Water)  250 mls @ 7.5 mls/hr IV 

TITRATE [facility]; Protocol


   Last Titration: 04/28/20 02:00 Dose:  0 mcg/min, 0 mls/hr


Potassium Cl/Dextrose/Lact Ringer's (D5 Lr With 20 Meq Kcl)  1,000 mls @ 75 mls/

hr IV ASDIRECTED [facility]


   Last Admin: 04/27/20 18:42 Dose:  75 mls/hr


Magnesium Sulfate 2 gm/ Premix  50 mls @ 25 mls/hr IV Q6H [facility]


   Stop: 04/28/20 17:59


   Last Admin: 04/28/20 16:02 Dose:  25 mls/hr


Dextrose/Lactated Ringer's (Dextrose 5%-Lactated Ringers)  1,000 mls @ 150 mls/

hr IV ASDIRECTED [facility]


   Last Admin: 04/29/20 07:23 Dose:  150 mls/hr


Potassium Chloride 20 meq/Lidocaine HCl 2 ml/ Sodium Chloride  112 mls @ 56 mls/

hr IV Q2H [facility]


   Stop: 04/28/20 17:59


   Last Admin: 04/28/20 16:57 Dose:  56 mls/hr


Ceftazidime 1 gm/ Sodium (Chloride)  50 mls @ 100 mls/hr IV Q8HR [facility]


   Last Admin: 04/28/20 13:58 Dose:  100 mls/hr


Lactated Ringer's (Ringers, Lactated)  1,000 mls @ 500 mls/hr IV ASDIRECTED [facility]


   Stop: 04/28/20 15:59


   Last Admin: 04/28/20 14:03 Dose:  500 mls/hr


Levofloxacin/Dextrose 750 mg/ (Premix)  150 mls @ 100 mls/hr IV Q24H [facility]


   Last Admin: 05/01/20 16:48 Dose:  100 mls/hr


Piperacillin/Tazobactam/ (Dextrose 3.375 gm/ Premix)  50 mls @ 100 mls/hr IV 

Q6H [facility]


   Last Admin: 05/02/20 09:16 Dose:  100 mls/hr


Dextrose/Lactated Ringer's (Dextrose 5%-Lactated Ringers)  1,000 mls @ 75 mls/

hr IV ASDIRECTED [facility]


   Last Admin: 04/30/20 22:55 Dose:  75 mls/hr


Potassium Chloride 20 meq/Lidocaine HCl 2 ml/ Sodium Chloride  112 mls @ 56 mls/

hr IV Q2H [facility]


   Stop: 04/30/20 13:59


   Last Admin: 04/30/20 12:01 Dose:  56 mls/hr


Sodium Chloride (Normal Saline)  250 mls @ 250 mls/hr IV ONETIME ONE


   Stop: 05/02/20 00:27


   Last Admin: 05/01/20 23:50 Dose:  250 mls/hr


Sodium Chloride (Normal Saline)  1,000 mls @ 500 mls/hr IV ASDIRECTED [facility]


   Last Admin: 05/02/20 11:31 Dose:  500 mls/hr


Sodium Chloride (Normal Saline)  1,000 mls @ 250 mls/hr IV ASDIRECTED [facility]


   Stop: 05/02/20 18:45


   Last Admin: 05/02/20 14:58 Dose:  250 mls/hr


Sodium Chloride (Normal Saline)  1,000 mls @ 125 mls/hr IV ASDIRECTED [facility]


Sodium Chloride (Normal Saline)  1,000 mls @ 125 mls/hr IV ASDIRECTED [facility]


   Stop: 05/03/20 03:30


   Last Admin: 05/02/20 19:59 Dose:  125 mls/hr


Lactated Ringer's (Ringers, Lactated)  1,000 ml IRR .STK-MED ONE


   Stop: 04/26/20 09:16


   Last Admin: 04/26/20 09:15 Dose:  1,000 ml


Lidocaine HCl (Xylocaine 2% Jelly)  10 ml MUCMEM ONETIME ONE


   Stop: 05/02/20 05:53


   Last Admin: 05/02/20 06:13 Dose:  10 ml


Lidocaine/Epinephrine (Xylocaine 1% With Epinephrine 1:100,000) Confirm 

Administered Dose 50 ml .ROUTE .STK-MED ONE


   Stop: 04/26/20 08:35


   Last Admin: 04/26/20 09:29 Dose:  20 ml


Neostigmine Methylsulfate (Neostigmine) Confirm Administered Dose 5 mg .ROUTE 

.STK-MED ONE


   Stop: 04/26/20 08:42


Ondansetron HCl (Zofran) Confirm Administered Dose 4 mg .ROUTE .STK-MED ONE


   Stop: 04/26/20 08:42


Pantoprazole Sodium (Protonix***)  40 mg PO ACBREAKFAST [facility]


   Last Admin: 04/27/20 08:20 Dose:  40 mg


Phenylephrine HCl (Niraj-Synephrine) Confirm Administered Dose 10 mg .ROUTE .STK-

MED ONE


   Stop: 04/26/20 09:05


Piperacillin Sod/Tazobactam Sod (Zosyn) Confirm Administered Dose 4.5 gm .ROUTE 

.STK-MED ONE


   Stop: 04/26/20 06:31


   Last Admin: 04/26/20 06:39 Dose:  Not Given


Propofol (Diprivan  20 Ml) Confirm Administered Dose 200 mg .ROUTE .STK-MED ONE


   Stop: 04/26/20 08:42


Rocuronium Bromide (Zemuron) Confirm Administered Dose 50 mg .ROUTE .STK-MED ONE


   Stop: 04/26/20 08:42


Succinylcholine Chloride (Quelicin) Confirm Administered Dose 200 mg .ROUTE .STK

-MED ONE


   Stop: 04/26/20 08:42


Vancomycin HCl (Vancomycin)  1 gm IV .PHARMACY TO DOSE [facility]


   Stop: 05/03/20 09:01











- Exam


Quality Assessment: Supplemental Oxygen, DVT Prophylaxis


General: Cooperative, Mild Distress, Lethargic.  No: Oriented


Lungs: Decreased Breath Sounds, Rales, Rhonchi.  No: Rub, Wheezing


Cardiovascular: Regular Rhythm, No Murmurs, Tachycardia


GI/Abdominal Exam: Soft, Non-Tender, No Organomegaly, No Distention


Extremities: Non-Tender, Pedal Edema


Skin: Warm, Dry, Intact





Sepsis Event Note





- Evaluation


Sepsis Screening Result: Sepsis Risk





- Focused Exam


Vital Signs: 


 Vital Signs











  Temp Pulse Resp BP Pulse Ox


 


 05/03/20 07:00  98.3 F  110 H  22 H  109/53 L  84 L


 


 05/03/20 03:00  97.8 F  111 H  18  95/53 L  94 L


 


 05/02/20 23:11   114 H   


 


 05/02/20 22:35  98.1 F  99  21 H  101/57 L  90 L











Date Exam was Performed: 05/03/20


Time Exam was Performed: 10:14





- Problem List Review


Problem List Initiated/Reviewed/Updated: Yes





- My Orders


Last 24 Hours: 


My Active Orders





05/03/20 08:12


Chest 1V Frontal [CR] Stat 





05/03/20 08:30


Levofloxacin/Dextrose 5%-Water [Levaquin in D5W 750 MG/150 ML] 750 mg   Premix 

Bag 1 bag IV Q24H 





05/03/20 09:20


Overnight Pulse Oximetry [RC] Click to Edit 


Pulse Oximetry Continuous Monitoring [OM.PC] Routine 





05/03/20 10:00


Piperacillin/Tazobactam/Dext [Zosyn in Dextrose Iso-Osmotic 3.375 GM] 3.375 gm 

  Premix Bag 1 bag IV Q6HR 





05/04/20 05:00


BASIC METABOLIC PANEL,BMP [CHEM] Timed 


CBC WITH AUTO DIFF [HEME] Timed 














- Plan


Plan:: 





ASSESSMENT AND PLAN  





Acute cholecystitis with septic shock-status post laparoscopic cholecystectomy, 

he has been more lethargic with decreased oral intake


-Saline lock IV


-Nutritional supplement 4 times daily





Pneumonia-since yesterday he has required increased level of supplemental 

oxygen.  Chest x-ray shows large infiltrate right lung and white blood cell 

count has increased.  The antibiotic therapy had been de-escalated as he was 

doing well and appeared to be improving.  Previously discussed with family at 

the time of his last episode of hypoxia, they did not want to proceed with 

further evaluation including CT scan.  I reviewed this again with his Sister 

Sakina and she confirms that they do not want to proceed with CT scan or other 

aggressive evaluation.


-Resume broad-spectrum IV antibiotic therapy with vancomycin, Zosyn, and 

levofloxacin


-Repeat blood cultures this morning


-Supplemental oxygen as needed





Hypokalemia


-Continue to monitor





Down syndrome-significant disability at this time.  Difficulty with 

communication.


-Continue home medications





Maintenance issues - 


- DVT prophylaxis -mechanical today, enoxaparin started tomorrow


- Nutrition -advance per surgical instructions


- [place] catheter -placed in the emergency room for strict intake and output 

monitoring in a critically ill patient





Admission justification -this patient will be admitted for inpatient services 

and is medically appropriate meeting medical necessity for inpatient admission 

as outlined in my documentation.  I reasonably expect the patient will require 

inpatient services that span a period time over 2 midnights. I reasonably 

expect this patient to be discharged or transferred within 96 hours after 

admission to the [facility].





Disposition-anticipate discharge back to assisted living facility

## 2020-05-04 RX ADMIN — PANTOPRAZOLE SODIUM SCH MG: 40 GRANULE, DELAYED RELEASE ORAL at 08:20

## 2020-05-04 RX ADMIN — POTASSIUM CHLORIDE SCH MEQ: 750 CAPSULE, EXTENDED RELEASE ORAL at 08:19

## 2020-05-04 RX ADMIN — HYDROCODONE BITARTRATE AND ACETAMINOPHEN PRN TAB: 5; 325 TABLET ORAL at 19:45

## 2020-05-04 RX ADMIN — TAZOBACTAM SODIUM AND PIPERACILLIN SODIUM SCH MLS/HR: 375; 3 INJECTION, SOLUTION INTRAVENOUS at 15:53

## 2020-05-04 RX ADMIN — TAZOBACTAM SODIUM AND PIPERACILLIN SODIUM SCH MLS/HR: 375; 3 INJECTION, SOLUTION INTRAVENOUS at 22:14

## 2020-05-04 RX ADMIN — Medication SCH CAP: at 08:20

## 2020-05-04 RX ADMIN — LEVOFLOXACIN SCH MLS/HR: 750 INJECTION, SOLUTION INTRAVENOUS at 08:13

## 2020-05-04 RX ADMIN — Medication SCH CAP: at 20:41

## 2020-05-04 RX ADMIN — TAZOBACTAM SODIUM AND PIPERACILLIN SODIUM SCH MLS/HR: 375; 3 INJECTION, SOLUTION INTRAVENOUS at 04:03

## 2020-05-04 RX ADMIN — ZINC OXIDE DIMETHICONE PRN APPLIC: 25; 20 SPRAY TOPICAL at 14:42

## 2020-05-04 RX ADMIN — TAZOBACTAM SODIUM AND PIPERACILLIN SODIUM SCH MLS/HR: 375; 3 INJECTION, SOLUTION INTRAVENOUS at 10:00

## 2020-05-04 NOTE — CR
CHEST: Portable 5/3/2020 at 8:50 AM

 

CLINICAL HISTORY:Hypoxia

 

COMPARISON:4/28/2020

 

FINDINGS:  There are diffuse, moderate bilateral pulmonary infiltrates increased

since the study from 4/28/2020. There is an increasing right pleural effusion.

Lung base opacities may be a combination of infiltrate and atelectasis.

 

Impression: Significant increase in bilateral pulmonary infiltrates

 

Right pleural effusion

## 2020-05-04 NOTE — PCM.PN
- General Info


Date of Service: 05/04/20


Subjective Update: 





There were no acute events overnight.  The patient remains dependent on 

norepinephrine to maintain adequate blood pressure.  He is minimally 

interactive this morning and does not provide any history or information.  Per 

nursing report he has not been eating very well.  He continues to be on 3 L of 

supplemental oxygen.  Cultures all remain negative.  He is very weak.


Functional Status: Reports: Pain Controlled





- Review of Systems


General: Denies: Fever





- Patient Data


Vitals - Most Recent: 


 Last Vital Signs











Temp  36.6 C   05/04/20 08:00


 


Pulse  97   05/04/20 08:00


 


Resp  23 H  05/04/20 08:00


 


BP  104/71   05/04/20 08:00


 


Pulse Ox  93 L  05/04/20 08:00











Weight - Most Recent: 72.575 kg


I&O - Last 24 Hours: 


 Intake & Output











 05/03/20 05/04/20 05/04/20





 22:59 06:59 14:59


 


Intake Total 570 490 180


 


Balance 570 490 180











Lab Results Last 24 Hours: 


 Laboratory Results - last 24 hr











  05/04/20 05/04/20 Range/Units





  04:10 04:10 


 


WBC  17.9 H   (4.5-11.0)  K/uL


 


RBC  3.18 L   (4.30-5.90)  M/uL


 


Hgb  10.1 L   (12.0-15.0)  g/dL


 


Hct  31.9 L   (40.0-54.0)  %


 


MCV  100 H   (80-98)  fL


 


MCH  32 H   (27-31)  pg


 


MCHC  32   (32-36)  %


 


Plt Count  279   (150-400)  K/uL


 


Neut % (Auto)  86 H   (36-66)  %


 


Lymph % (Auto)  7 L   (24-44)  %


 


Mono % (Auto)  7 H   (2-6)  %


 


Eos % (Auto)  0 L   (2-4)  %


 


Baso % (Auto)  0   (0-1)  %


 


Sodium   142  (140-148)  mmol/L


 


Potassium   3.8  (3.6-5.2)  mmol/L


 


Chloride   107  (100-108)  mmol/L


 


Carbon Dioxide   30  (21-32)  mmol/L


 


Anion Gap   4.6 L  (5.0-14.0)  mmol/L


 


BUN   5 L  (7-18)  mg/dL


 


Creatinine   0.7 L  (0.8-1.3)  mg/dL


 


Est Cr Clr Drug Dosing   108.57  mL/min


 


Estimated GFR (MDRD)   > 60  (>60)  


 


Glucose   110 H  ()  mg/dL


 


Calcium   7.2 L  (8.5-10.1)  mg/dL











Med Orders - Current: 


 Current Medications





Hydrocodone Bitart/Acetaminophen (Norco 325-5 Mg)  1 tab PO Q4H PRN


   PRN Reason: Pain (moderate 4-6)


   Last Admin: 05/03/20 22:14 Dose:  1 tab


Albuterol/Ipratropium (Duoneb 3.0-0.5 Mg/3 Ml)  3 ml NEB Q4H PRN


   PRN Reason: Dyspnea


   Last Admin: 05/04/20 04:07 Dose:  3 ml


Benzocaine/Menthol (Cepacol Sore Throat)  1 lozenge MUCMEM Q4H PRN


   PRN Reason: Sore Throat


Docusate Sodium (Colace)  100 mg PO BID PRN


   PRN Reason: Constipation


Donepezil HCl (Aricept)  10 mg PO DAILY [facility]


   Last Admin: 05/04/20 08:19 Dose:  10 mg


Enoxaparin Sodium (Lovenox)  40 mg SUBCUT DAILY [facility]


   Last Admin: 05/04/20 08:19 Dose:  40 mg


Furosemide (Lasix)  20 mg IVPUSH ONETIME ONE


   Stop: 05/04/20 09:13


Hydrocortisone (Hydrocortisone 1% Crm)  0 gm TOP BID PRN


   PRN Reason: Rash


Hydroxyzine HCl (Vistaril)  100 mg IM Q4H PRN


   PRN Reason: Breakthrough Pain


Levofloxacin/Dextrose 750 mg/ (Premix)  150 mls @ 100 mls/hr IV Q24H [facility]


   Last Admin: 05/04/20 08:13 Dose:  100 mls/hr


Piperacillin/Tazobactam/ (Dextrose 3.375 gm/ Premix)  50 mls @ 100 mls/hr IV 

Q6HR [facility]


   Last Admin: 05/04/20 04:03 Dose:  100 mls/hr


Vancomycin HCl 1 gm/ Sodium (Chloride)  250 mls @ 167 mls/hr IV Q12H [facility]


   Last Admin: 05/03/20 22:18 Dose:  167 mls/hr


Norepinephrine Bitartrate 4 mg (/ Dextrose/Water)  250 mls @ 7.5 mls/hr IV 

TITRATE [facility]; Protocol


   Last Titration: 05/04/20 08:05 Dose:  3 mcg/min, 11.25 mls/hr


Lactobacillus Rhamnosus (Culturelle)  1 cap PO BID [facility]


   Last Admin: 05/04/20 08:20 Dose:  1 cap


Levetiracetam (Keppra)  750 mg PO BID [facility]


   Last Admin: 05/04/20 08:20 Dose:  750 mg


Lidocaine HCl (Xylocaine 2% Jelly)  10 ml MUCMEM BID PRN


   PRN Reason: Other


Magnesium Oxide (Magnesium Oxide)  400 mg PO BID [facility]


   Last Admin: 05/04/20 08:19 Dose:  400 mg


Ondansetron HCl (Zofran Odt)  4 mg PO Q4H PRN


   PRN Reason: Nausea/Vomiting


Ondansetron HCl (Zofran)  4 mg IVPUSH Q4H PRN


   PRN Reason: Nausea/Vomiting


Oxcarbazepine (Trileptal)  150 mg PO BID [facility]


   Last Admin: 05/04/20 08:19 Dose:  150 mg


Pantoprazole Sodium (Protonix Granules***)  40 mg PO DAILY@0730 [facility]


   Last Admin: 05/04/20 08:20 Dose:  40 mg


Potassium Chloride (Potassium Chloride)  10 meq PO DAILY [facility]


   Last Admin: 05/04/20 08:19 Dose:  10 meq


Quetiapine Fumarate (Seroquel)  50 mg PO TID [facility]


   Last Admin: 05/04/20 08:19 Dose:  50 mg


Sertraline HCl (Zoloft)  50 mg PO DAILY [facility]


   Last Admin: 05/04/20 08:20 Dose:  50 mg


Zolpidem Tartrate (Ambien)  5 mg PO BEDTIME PRN


   PRN Reason: Sleep





Discontinued Medications





Acetaminophen (Tylenol)  650 mg RECTAL NOW ONE


   Stop: 04/26/20 05:59


   Last Admin: 04/26/20 06:03 Dose:  650 mg


Amoxicillin/Clavulanate Potassium (Augmentin 875 Mg/125 Mg)  1 tab PO BID [facility]


   Last Admin: 05/03/20 08:04 Dose:  1 tab


Bupivacaine HCl (Marcaine 0.5%) Confirm Administered Dose 50 ml .ROUTE .STK-MED 

ONE


   Stop: 04/26/20 08:35


   Last Admin: 04/26/20 09:29 Dose:  20 ml


Ropivacaine 36 ml/Dexamethasone 8 mg/Epinephrine HCl 0.4 mg/ Sodium Chloride 

41.6 ml  [facility]


   Last Admin: 04/26/20 09:35 Dose:  80 syringe


Dexamethasone (Dexamethasone) Confirm Administered Dose 4 mg .ROUTE .STK-MED ONE


   Stop: 04/26/20 08:42


Enoxaparin Sodium (Lovenox)  40 mg SUBCUT DAILY [facility]


   Last Admin: 04/28/20 08:54 Dose:  40 mg


Fentanyl (Sublimaze) Confirm Administered Dose 250 mcg .ROUTE .STK-MED ONE


   Stop: 04/26/20 08:42


Furosemide (Lasix)  20 mg IVPUSH ONETIME ONE


   Stop: 05/02/20 23:16


   Last Admin: 05/02/20 23:54 Dose:  20 mg


Glycopyrrolate (Robinul) Confirm Administered Dose 1 mg .ROUTE .STK-MED ONE


   Stop: 04/26/20 08:42


Sodium Chloride (Normal Saline)  1,000 mls [facility]


   Last Admin: 04/26/20 05:40 Dose:  999 mls/hr


Sodium Chloride (Normal Saline)  1,000 mls @ 999 [facility]


   Last Admin: 04/26/20 06:30 Dose:  999 mls/hr


Piperacillin Sod/Tazobactam (Sod 4.5 gm/ Sodium Chloride)  100 mls @ 100 mls/hr 

IV ONETIME ONE


   Stop: 04/26/20 07:16


   Last Admin: 04/26/20 06:35 Dose:  100 mls/hr


Aztreonam 1 gm/ Sodium (Chloride)  50 mls @ 100 mls/hr IV ONETIME ONE


   Stop: 04/26/20 07:01


   Last Admin: 04/26/20 06:53 Dose:  100 mls/hr


Sodium Chloride (Normal Saline) Confirm Administered Dose 100 mls @ as directed 

.ROUTE .[facility] ONE


   Stop: 04/26/20 06:33


   Last Admin: 04/26/20 06:40 Dose:  Not Given


Sodium Chloride (Normal Saline) Confirm Administered Dose 100 mls @ as directed 

.ROUTE .[facility] ONE


   Stop: 04/26/20 06:34


   Last Admin: 04/26/20 06:40 Dose:  Not Given


Sodium Chloride (Normal Saline)  1,000 mls @ 500 mls/hr IV ASDIRECT [facility]


   Last Admin: 04/26/20 07:36 Dose:  500 mls/hr


Lactated Ringer's (Ringers, Lactated) Confirm Administered Dose 1,000 mls @ as 

directed .ROUTE .ST-MED ONE


   Stop: 04/26/20 09:01


Sodium Chloride (Normal Saline) Confirm Administered Dose 10 mls @ as directed 

.ROUTE .STK-MED ONE


   Stop: 04/26/20 09:05


Lactated Ringer's (Ringers, Lactated) Confirm Administered Dose 1,000 mls @ as 

directed .ROUTE .ST-MED ONE


   Stop: 04/26/20 09:08


Lactated Ringer's (Ringers, Lactated) Confirm Administered Dose 1,000 mls @ as 

directed .ROUTE .[facility] ONE


   Stop: 04/26/20 09:38


Piperacillin/Tazobactam/ (Dextrose 3.375 gm/ Premix)  50 mls @ 100 mls/hr IV 

Q6H [facility]


   Last Admin: 04/28/20 07:56 Dose:  100 mls/hr


Potassium Cl/Dextrose/Lact Ringer's (D5 Lr With 20 Meq Kcl)  1,000 mls @ 125 mls

/hr IV ASDIRECTED [facility]


   Last Admin: 04/26/20 21:54 Dose:  125 mls/hr


Vancomycin HCl 1 gm/ Sodium (Chloride)  250 mls @ 166.667 mls/hr IV Q12H [facility]


   Last Admin: 04/28/20 00:19 Dose:  166.667 mls/hr


Norepinephrine Bitartrate 4 mg (/ Dextrose/Water)  250 mls @ 7.5 mls/hr IV 

TITRATE [facility]; Protocol


   Last Titration: 04/28/20 02:00 Dose:  0 mcg/min, 0 mls/hr


Potassium Cl/Dextrose/Lact Ringer's (D5 Lr With 20 Meq Kcl)  1,000 mls @ 75 mls/

hr IV ASDIRECTED [facility]


   Last Admin: 04/27/20 18:42 Dose:  75 mls/hr


Magnesium Sulfate 2 gm/ Premix  50 mls @ 25 mls/hr IV Q6H [facility]


   Stop: 04/28/20 17:59


   Last Admin: 04/28/20 16:02 Dose:  25 mls/hr


Dextrose/Lactated Ringer's (Dextrose 5%-Lactated Ringers)  1,000 mls @ 150 mls/

hr IV ASDIRECTED [facility]


   Last Admin: 04/29/20 07:23 Dose:  150 mls/hr


Potassium Chloride 20 meq/Lidocaine HCl 2 ml/ Sodium Chloride  112 mls @ 56 mls/

hr IV Q2H [facility]


   Stop: 04/28/20 17:59


   Last Admin: 04/28/20 16:57 Dose:  56 mls/hr


Ceftazidime 1 gm/ Sodium (Chloride)  50 mls @ 100 mls/hr IV Q8HR [facility]


   Last Admin: 04/28/20 13:58 Dose:  100 mls/hr


Lactated Ringer's (Ringers, Lactated)  1,000 mls @ 500 mls/hr IV ASDIRECTED [facility]


   Stop: 04/28/20 15:59


   Last Admin: 04/28/20 14:03 Dose:  500 mls/hr


Levofloxacin/Dextrose 750 mg/ (Premix)  150 mls @ 100 mls/hr IV Q24H [facility]


   Last Admin: 05/01/20 16:48 Dose:  100 mls/hr


Piperacillin/Tazobactam/ (Dextrose 3.375 gm/ Premix)  50 mls @ 100 mls/hr IV 

Q6H [facility]


   Last Admin: 05/02/20 09:16 Dose:  100 mls/hr


Dextrose/Lactated Ringer's (Dextrose 5%-Lactated Ringers)  1,000 mls @ 75 mls/

hr IV ASDIRECTED [facility]


   Last Admin: 04/30/20 22:55 Dose:  75 mls/hr


Potassium Chloride 20 meq/Lidocaine HCl 2 ml/ Sodium Chloride  112 mls @ 56 mls/

hr IV Q2H [facility]


   Stop: 04/30/20 13:59


   Last Admin: 04/30/20 12:01 Dose:  56 mls/hr


Sodium Chloride (Normal Saline)  250 mls @ 250 mls/hr IV ONETIME ONE


   Stop: 05/02/20 00:27


   Last Admin: 05/01/20 23:50 Dose:  250 mls/hr


Sodium Chloride (Normal Saline)  1,000 mls @ 500 mls/hr IV ASDIRECTED [facility]


   Last Admin: 05/02/20 11:31 Dose:  500 mls/hr


Sodium Chloride (Normal Saline)  1,000 mls @ 250 mls/hr IV ASDIRECTED [facility]


   Stop: 05/02/20 18:45


   Last Admin: 05/02/20 14:58 Dose:  250 mls/hr


Sodium Chloride (Normal Saline)  1,000 mls @ 125 mls/hr IV ASDIRECTED [facility]


Sodium Chloride (Normal Saline)  1,000 mls @ 125 mls/hr IV ASDIRECTED [facility]


   Stop: 05/03/20 03:30


   Last Admin: 05/02/20 19:59 Dose:  125 mls/hr


Potassium Chloride 20 meq/Lidocaine HCl 2 ml/ Sodium Chloride  112 mls @ 56 mls/

hr IV Q2H [facility]


   Stop: 05/03/20 17:59


   Last Admin: 05/03/20 16:57 Dose:  56 mls/hr


Lactated Ringer's (Ringers, Lactated)  1,000 ml IRR .STK-MED ONE


   Stop: 04/26/20 09:16


   Last Admin: 04/26/20 09:15 Dose:  1,000 ml


Lidocaine HCl (Xylocaine 2% Jelly)  10 ml MUCMEM ONETIME ONE


   Stop: 05/02/20 05:53


   Last Admin: 05/02/20 06:13 Dose:  10 ml


Lidocaine/Epinephrine (Xylocaine 1% With Epinephrine 1:100,000) Confirm 

Administered Dose 50 ml .ROUTE .STK-MED ONE


   Stop: 04/26/20 08:35


   Last Admin: 04/26/20 09:29 Dose:  20 ml


Neostigmine Methylsulfate (Neostigmine) Confirm Administered Dose 5 mg .ROUTE 

.STK-MED ONE


   Stop: 04/26/20 08:42


Ondansetron HCl (Zofran) Confirm Administered Dose 4 mg .ROUTE .STK-MED ONE


   Stop: 04/26/20 08:42


Pantoprazole Sodium (Protonix***)  40 mg PO ACBREAKFAST [facility]


   Last Admin: 04/27/20 08:20 Dose:  40 mg


Phenylephrine HCl (Niraj-Synephrine) Confirm Administered Dose 10 mg .ROUTE .STK-

MED ONE


   Stop: 04/26/20 09:05


Piperacillin Sod/Tazobactam Sod (Zosyn) Confirm Administered Dose 4.5 gm .ROUTE 

.STK-MED ONE


   Stop: 04/26/20 06:31


   Last Admin: 04/26/20 06:39 Dose:  Not Given


Potassium Chloride (Potassium Chloride)  40 meq PO ONETIME ONE


   Stop: 05/03/20 14:01


   Last Admin: 05/03/20 13:49 Dose:  40 meq


Propofol (Diprivan  20 Ml) Confirm Administered Dose 200 mg .ROUTE .STK-MED ONE


   Stop: 04/26/20 08:42


Rocuronium Bromide (Zemuron) Confirm Administered Dose 50 mg .ROUTE .STK-MED ONE


   Stop: 04/26/20 08:42


Succinylcholine Chloride (Quelicin) Confirm Administered Dose 200 mg .ROUTE .STK

-MED ONE


   Stop: 04/26/20 08:42


Vancomycin HCl (Vancomycin)  1 gm IV .PHARMACY TO DOSE [facility]


   Stop: 05/03/20 09:01











- Exam


Quality Assessment: Supplemental Oxygen


General: Alert, No Acute Distress, Lethargic


Lungs: Normal Respiratory Effort, Decreased Breath Sounds (right mid lung), 

Crackles (mild diffuse ).  No: Wheezing


Cardiovascular: Regular Rhythm, Tachycardia


GI/Abdominal Exam: Normal Bowel Sounds, Soft, No Distention


Extremities: Pedal Edema (trace bilateral ).  No: Increased Warmth


Skin: Warm, Dry


Psy/Mental Status: Alert.  No: Agitated





Sepsis Event Note





- Evaluation


Sepsis Screening Result: Sepsis Risk





- Focused Exam


Vital Signs: 


 Vital Signs











  Temp Pulse Resp BP Pulse Ox


 


 05/04/20 08:00  36.6 C  97  23 H  104/71  93 L


 


 05/04/20 07:00  36.6 C  103 H  26 H  104/62  92 L


 


 05/04/20 06:00   103 H  21 H  110/56 L  95


 


 05/04/20 05:00   106 H  24 H  102/62  94 L


 


 05/04/20 04:00  36.5 C  106 H  27 H  111/54 L  93 L


 


 05/04/20 03:00   101 H  24 H  96/51 L  3 L


 


 05/04/20 02:00   106 H  21 H  106/61  95


 


 05/04/20 01:00   100  23 H  96/63  95


 


 05/04/20 00:00   107 H  18  92/50 L  95


 


 05/03/20 23:00  37.1 C  114 H  20  98/54 L  94 L


 


 05/03/20 22:00  37.1 C  115 H  22 H  118/57 L  90 L











Date Exam was Performed: 05/04/20


Time Exam was Performed: 12:32





- Problem List & Annotations


(1) Septic shock due to Escherichia coli


SNOMED Code(s): 02654627


   Code(s): A41.51 - SEPSIS DUE TO ESCHERICHIA COLI [E. COLI]; R65.21 - SEVERE 

SEPSIS WITH SEPTIC SHOCK   Status: Acute   Current Visit: Yes   





(2) Severe sepsis


SNOMED Code(s): 63626982


   Code(s): A41.9 - SEPSIS, UNSPECIFIED ORGANISM; R65.20 - SEVERE SEPSIS 

WITHOUT SEPTIC SHOCK   Status: Acute   Current Visit: Yes   





(3) Hypokalemia


SNOMED Code(s): 10934267


   Code(s): E87.6 - HYPOKALEMIA   Status: Acute   Current Visit: Yes   





(4) Down's syndrome


SNOMED Code(s): 61966813


   Code(s): Q90.9 - DOWN SYNDROME, UNSPECIFIED   Status: Chronic   Current Visit

: No   





(5) Acute cholecystitis


SNOMED Code(s): 18976775


   Code(s): K81.0 - ACUTE CHOLECYSTITIS   Status: Acute   Current Visit: Yes   





- Problem List Review


Problem List Initiated/Reviewed/Updated: Yes





- My Orders


Last 24 Hours: 


My Active Orders





05/04/20 09:12


Weight Daily [Height and Weight] [RC] DAILY 


Furosemide [Lasix]   20 mg IVPUSH ONETIME ONE 





05/05/20 05:00


BASIC METABOLIC PANEL,BMP [CHEM] Timed 


CBC W/O DIFF,HEMOGRAM [HEME] Timed (1) 














- Plan


Plan:: 





ASSESSMENT AND PLAN  





Pneumonia-complicated by acute respiratory failure with hypoxia and recurrent 

hypotension.  Respiratory status is stable on 3 L.  He is very lethargic and 

weak.  Vasopressor requirement is slowly decreasing.  Possibly a component of 

volume overload related to the pneumonia.  Cultures negative so far.


-Continue broad-spectrum IV antibiotic therapy with vancomycin, Zosyn, and 

levofloxacin


-As needed nebulizers


-Dose of furosemide this morning


-Continue vasopressor support, wean as able


-Follow-up cultures


-Supplemental oxygen as needed





Acute cholecystitis with septic shock-status post laparoscopic cholecystectomy.

  Poor oral intake but otherwise doing okay from a surgical standpoint.


-Saline lock IV


-Nutritional supplement 4 times daily





Hypokalemia-improved with supplementation.


-Continue to monitor





Down syndrome-significant disability at this time.  Difficulty with 

communication.


-Continue home medications





Maintenance issues - 


- DVT prophylaxis -mechanical today, enoxaparin started tomorrow


- Nutrition -advance per surgical instructions





Disposition-anticipate discharge back to assisted living facility





Florian Keita MD

## 2020-05-04 NOTE — PN
DATE OF SERVICE:  05/04/2020

 

SUBJECTIVE:  The patient is doing very well from the gallbladder surgery, still having

problems with pressors.

 

OBJECTIVE:  VITAL SIGNS:  Stable.

CARDIOVASCULAR:  Regular rhythm and rate.

LUNGS:  Poor inspiratory effort bilaterally.

 

ASSESSMENT:  Status post laparoscopic cholecystectomy.

 

PLAN:  With respect to his gallbladder surgery, he has recovered quite well.  Incisions are

healing well.  He is having bowel movements with no problems.  He remains on the pressors

and ongoing hospice discussion is continuing.

 

 

 

 

Omari Cuevas MD

DD:  05/04/2020 07:51:16

DT:  05/04/2020 08:09:11

Job #:  [id number]

## 2020-05-05 RX ADMIN — PANTOPRAZOLE SODIUM SCH MG: 40 GRANULE, DELAYED RELEASE ORAL at 08:35

## 2020-05-05 RX ADMIN — TAZOBACTAM SODIUM AND PIPERACILLIN SODIUM SCH MLS/HR: 375; 3 INJECTION, SOLUTION INTRAVENOUS at 16:00

## 2020-05-05 RX ADMIN — ZINC OXIDE DIMETHICONE PRN APPLIC: 25; 20 SPRAY TOPICAL at 00:34

## 2020-05-05 RX ADMIN — TAZOBACTAM SODIUM AND PIPERACILLIN SODIUM SCH MLS/HR: 375; 3 INJECTION, SOLUTION INTRAVENOUS at 22:16

## 2020-05-05 RX ADMIN — Medication SCH CAP: at 20:02

## 2020-05-05 RX ADMIN — Medication SCH CAP: at 08:33

## 2020-05-05 RX ADMIN — TAZOBACTAM SODIUM AND PIPERACILLIN SODIUM SCH MLS/HR: 375; 3 INJECTION, SOLUTION INTRAVENOUS at 04:01

## 2020-05-05 RX ADMIN — TAZOBACTAM SODIUM AND PIPERACILLIN SODIUM SCH MLS/HR: 375; 3 INJECTION, SOLUTION INTRAVENOUS at 10:11

## 2020-05-05 RX ADMIN — LEVOFLOXACIN SCH MLS/HR: 750 INJECTION, SOLUTION INTRAVENOUS at 08:28

## 2020-05-05 RX ADMIN — POTASSIUM CHLORIDE SCH MEQ: 750 CAPSULE, EXTENDED RELEASE ORAL at 08:34

## 2020-05-05 NOTE — PCM.PN
- General Info


Date of Service: 05/05/20


Subjective Update: 





There were no acute events overnight.  Trae remains somewhat lethargic and 

minimally interactive so I was not able to gather any usable history from him 

this morning.  The norepinephrine was weaned off yesterday evening and has not 

been restarted.  He did have an increase in his respiratory rate in the evening 

this has come down overnight.  He appears comfortable.  He has not been eating 

much.  He is weak but seems to be getting a little stronger.  He continues to 

require 3 L of supplemental oxygen.


Functional Status: Reports: Pain Controlled, Tolerating Diet (not eating much )





- Review of Systems


General: Denies: Fever





- Patient Data


Vitals - Most Recent: 


 Last Vital Signs











Temp  36.7 C   05/05/20 09:00


 


Pulse  101 H  05/05/20 09:00


 


Resp  22 H  05/05/20 09:00


 


BP  108/62   05/05/20 09:00


 


Pulse Ox  93 L  05/05/20 09:00











Weight - Most Recent: 72.575 kg


I&O - Last 24 Hours: 


 Intake & Output











 05/04/20 05/05/20 05/05/20





 22:59 06:59 14:59


 


Intake Total 210 350 300


 


Balance 210 350 300











Lab Results Last 24 Hours: 


 Laboratory Results - last 24 hr











  05/05/20 05/05/20 Range/Units





  04:50 04:50 


 


WBC  12.0 H   (4.5-11.0)  K/uL


 


RBC  3.11 L   (4.30-5.90)  M/uL


 


Hgb  9.7 L   (12.0-15.0)  g/dL


 


Hct  31.2 L   (40.0-54.0)  %


 


MCV  100 H   (80-98)  fL


 


MCH  31   (27-31)  pg


 


MCHC  31 L   (32-36)  %


 


Plt Count  253   (150-400)  K/uL


 


Sodium   142  (140-148)  mmol/L


 


Potassium   3.7  (3.6-5.2)  mmol/L


 


Chloride   105  (100-108)  mmol/L


 


Carbon Dioxide   32  (21-32)  mmol/L


 


Anion Gap   4.8 L  (5.0-14.0)  mmol/L


 


BUN   6 L  (7-18)  mg/dL


 


Creatinine   0.7 L  (0.8-1.3)  mg/dL


 


Est Cr Clr Drug Dosing   108.57  mL/min


 


Estimated GFR (MDRD)   > 60  (>60)  


 


Glucose   84  ()  mg/dL


 


Calcium   7.5 L  (8.5-10.1)  mg/dL











Roberto Results Last 24 Hours: 


 Microbiology











 05/03/20 10:45 Aerobic Blood Culture - Preliminary





 Blood - Arm, Left    NO GROWTH AFTER 1 DAY





 Anaerobic Blood Culture - Preliminary





    NO GROWTH AFTER 1 DAY


 


 05/03/20 10:36 Aerobic Blood Culture - Preliminary





 Blood - Arm, Right    NO GROWTH AFTER 1 DAY





 Anaerobic Blood Culture - Preliminary





    NO GROWTH AFTER 1 DAY











Med Orders - Current: 


 Current Medications





Hydrocodone Bitart/Acetaminophen (Norco 325-5 Mg)  1 tab PO Q4H PRN


   PRN Reason: Pain (moderate 4-6)


   Last Admin: 05/04/20 19:45 Dose:  1 tab


Albuterol/Ipratropium (Duoneb 3.0-0.5 Mg/3 Ml)  3 ml NEB Q4H PRN


   PRN Reason: Dyspnea


   Last Admin: 05/04/20 17:30 Dose:  3 ml


Benzocaine/Menthol (Cepacol Sore Throat)  1 lozenge MUCMEM Q4H PRN


   PRN Reason: Sore Throat


Dimethicone/Zinc Oxide (Rash Relief-Zinc Oxide Spray)  1 gm TOP ASDIRECTED PRN


   PRN Reason: Rash


   Last Admin: 05/05/20 00:34 Dose:  1 applic


Docusate Sodium (Colace)  100 mg PO BID PRN


   PRN Reason: Constipation


Donepezil HCl (Aricept)  10 mg PO DAILY [facility]


   Last Admin: 05/05/20 08:34 Dose:  10 mg


Enoxaparin Sodium (Lovenox)  40 mg SUBCUT DAILY [facility]


   Last Admin: 05/05/20 08:33 Dose:  40 mg


Hydrocortisone (Hydrocortisone 1% Crm)  0 gm TOP BID PRN


   PRN Reason: Rash


Hydroxyzine HCl (Vistaril)  100 mg IM Q4H PRN


   PRN Reason: Breakthrough Pain


Levofloxacin/Dextrose 750 mg/ (Premix)  150 mls @ 100 mls/hr IV Q24H [facility]


   Last Admin: 05/05/20 08:28 Dose:  100 mls/hr


Piperacillin/Tazobactam/ (Dextrose 3.375 gm/ Premix)  50 mls @ 100 mls/hr IV 

Q6HR [facility]


   Last Admin: 05/05/20 04:01 Dose:  100 mls/hr


Lactobacillus Rhamnosus (Culturelle)  1 cap PO BID [facility]


   Last Admin: 05/05/20 08:33 Dose:  1 cap


Levetiracetam (Keppra)  750 mg PO BID [facility]


   Last Admin: 05/05/20 08:34 Dose:  750 mg


Lidocaine HCl (Xylocaine 2% Jelly)  10 ml MUCMEM BID PRN


   PRN Reason: Other


Magnesium Oxide (Magnesium Oxide)  400 mg PO BID [facility]


   Last Admin: 05/05/20 08:35 Dose:  400 mg


Ondansetron HCl (Zofran Odt)  4 mg PO Q4H PRN


   PRN Reason: Nausea/Vomiting


Ondansetron HCl (Zofran)  4 mg IVPUSH Q4H PRN


   PRN Reason: Nausea/Vomiting


Oxcarbazepine (Trileptal)  150 mg PO BID [facility]


   Last Admin: 05/05/20 08:34 Dose:  150 mg


Pantoprazole Sodium (Protonix Granules***)  40 mg PO DAILY@0730 [facility]


   Last Admin: 05/05/20 08:35 Dose:  40 mg


Potassium Chloride (Potassium Chloride)  10 meq PO DAILY [facility]


   Last Admin: 05/05/20 08:34 Dose:  10 meq


Quetiapine Fumarate (Seroquel)  50 mg PO TID [facility]


   Last Admin: 05/05/20 08:34 Dose:  50 mg


Sertraline HCl (Zoloft)  50 mg PO DAILY [facility]


   Last Admin: 05/05/20 08:34 Dose:  50 mg


Zolpidem Tartrate (Ambien)  5 mg PO BEDTIME PRN


   PRN Reason: Sleep


   Last Admin: 05/04/20 20:42 Dose:  5 mg





Discontinued Medications





Acetaminophen (Tylenol)  650 mg RECTAL NOW ONE


   Stop: 04/26/20 05:59


   Last Admin: 04/26/20 06:03 Dose:  650 mg


Amoxicillin/Clavulanate Potassium (Augmentin 875 Mg/125 Mg)  1 tab PO BID [facility]


   Last Admin: 05/03/20 08:04 Dose:  1 tab


Bupivacaine HCl (Marcaine 0.5%) Confirm Administered Dose 50 ml .ROUTE .STK-MED 

ONE


   Stop: 04/26/20 08:35


   Last Admin: 04/26/20 09:29 Dose:  20 ml


Ropivacaine 36 ml/Dexamethasone 8 mg/Epinephrine HCl 0.4 mg/ Sodium Chloride 

41.6 ml  [facility]


   Last Admin: 04/26/20 09:35 Dose:  80 syringe


Dexamethasone (Dexamethasone) Confirm Administered Dose 4 mg .ROUTE .[facility]-MED ONE


   Stop: 04/26/20 08:42


Enoxaparin Sodium (Lovenox)  40 mg SUBCUT DAILY [facility]


   Last Admin: 04/28/20 08:54 Dose:  40 mg


Fentanyl (Sublimaze) Confirm Administered Dose 250 mcg .ROUTE .[facility]-MED ONE


   Stop: 04/26/20 08:42


Furosemide (Lasix)  20 mg IVPUSH ONETIME ONE


   Stop: 05/02/20 23:16


   Last Admin: 05/02/20 23:54 Dose:  20 mg


Furosemide (Lasix)  20 mg IVPUSH ONETIME ONE


   Stop: 05/04/20 09:13


   Last Admin: 05/04/20 09:57 Dose:  20 mg


Glycopyrrolate (Robinul) Confirm Administered Dose 1 mg .ROUTE .STK-MED ONE


   Stop: 04/26/20 08:42


Sodium Chloride (Normal Saline)  1,000 mls [facility]


   Last Admin: 04/26/20 05:40 Dose:  999 mls/hr


Sodium Chloride (Normal Saline)  1,000 mls @ 999 mls/hr IV [facility]


   Last Admin: 04/26/20 06:30 Dose:  999 mls/hr


Piperacillin Sod/Tazobactam (Sod 4.5 gm/ Sodium Chloride)  100 mls @ 100 mls/hr 

IV ONETIME ONE


   Stop: 04/26/20 07:16


   Last Admin: 04/26/20 06:35 Dose:  100 mls/hr


Aztreonam 1 gm/ Sodium (Chloride)  50 mls @ 100 mls/hr IV ONETIME ONE


   Stop: 04/26/20 07:01


   Last Admin: 04/26/20 06:53 Dose:  100 mls/hr


Sodium Chloride (Normal Saline) Confirm Administered Dose 100 mls @ as directed 

.ROUTE .[facility] ONE


   Stop: 04/26/20 06:33


   Last Admin: 04/26/20 06:40 Dose:  Not Given


Sodium Chloride (Normal Saline) Confirm Administered Dose 100 mls @ as directed 

.ROUTE .[facility] ONE


   Stop: 04/26/20 06:34


   Last Admin: 04/26/20 06:40 Dose:  Not Given


Sodium Chloride (Normal Saline)  1,000 mls @ 500 mls/hr IV ASDIRECTED [facility]


   Last Admin: 04/26/20 07:36 Dose:  500 mls/hr


Lactated Ringer's (Ringers, Lactated) Confirm Administered Dose 1,000 mls @ as 

directed .ROUTE .STK-MED ONE


   Stop: 04/26/20 09:01


Sodium Chloride (Normal Saline) Confirm Administered Dose 10 mls @ as directed 

.ROUTE .STK-MED ONE


   Stop: 04/26/20 09:05


Lactated Ringer's (Ringers, Lactated) Confirm Administered Dose 1,000 mls @ as 

directed .ROUTE .STK-MED ONE


   Stop: 04/26/20 09:08


Lactated Ringer's (Ringers, Lactated) Confirm Administered Dose 1,000 mls @ as 

directed .ROUTE .STK-MED ONE


   Stop: 04/26/20 09:38


Piperacillin/Tazobactam/ (Dextrose 3.375 gm/ Premix)  50 mls @ 100 mls/hr IV 

Q6H [facility]


   Last Admin: 04/28/20 07:56 Dose:  100 mls/hr


Potassium Cl/Dextrose/Lact Ringer's (D5 Lr With 20 Meq Kcl)  1,000 mls @ 125 mls

/hr IV ASDIRECTED [facility]


   Last Admin: 04/26/20 21:54 Dose:  125 mls/hr


Vancomycin HCl 1 gm/ Sodium (Chloride)  250 mls @ 166.667 mls/hr IV Q12H [facility]


   Last Admin: 04/28/20 00:19 Dose:  166.667 mls/hr


Norepinephrine Bitartrate 4 mg (/ Dextrose/Water)  250 mls @ 7.5 mls/hr IV 

TITRATE [facility]; Protocol


   Last Titration: 04/28/20 02:00 Dose:  0 mcg/min, 0 mls/hr


Potassium Cl/Dextrose/Lact Ringer's (D5 Lr With 20 Meq Kcl)  1,000 mls @ 75 mls/

hr IV ASDIRECTED [facility]


   Last Admin: 04/27/20 18:42 Dose:  75 mls/hr


Magnesium Sulfate 2 gm/ Premix  50 mls @ 25 mls/hr IV Q6H [facility]


   Stop: 04/28/20 17:59


   Last Admin: 04/28/20 16:02 Dose:  25 mls/hr


Dextrose/Lactated Ringer's (Dextrose 5%-Lactated Ringers)  1,000 mls @ 150 mls/

hr IV ASDIRECTED [facility]


   Last Admin: 04/29/20 07:23 Dose:  150 mls/hr


Potassium Chloride 20 meq/Lidocaine HCl 2 ml/ Sodium Chloride  112 mls @ 56 mls/

hr IV Q2H [facility]


   Stop: 04/28/20 17:59


   Last Admin: 04/28/20 16:57 Dose:  56 mls/hr


Ceftazidime 1 gm/ Sodium (Chloride)  50 mls @ 100 mls/hr IV Q8HR [facility]


   Last Admin: 04/28/20 13:58 Dose:  100 mls/hr


Lactated Ringer's (Ringers, Lactated)  1,000 mls @ 500 mls/hr IV ASDIRECTED [facility]


   Stop: 04/28/20 15:59


   Last Admin: 04/28/20 14:03 Dose:  500 mls/hr


Levofloxacin/Dextrose 750 mg/ (Premix)  150 mls @ 100 mls/hr IV Q24H [facility]


   Last Admin: 05/01/20 16:48 Dose:  100 mls/hr


Piperacillin/Tazobactam/ (Dextrose 3.375 gm/ Premix)  50 mls @ 100 mls/hr IV 

Q6H [facility]


   Last Admin: 05/02/20 09:16 Dose:  100 mls/hr


Dextrose/Lactated Ringer's (Dextrose 5%-Lactated Ringers)  1,000 mls @ 75 mls/

hr IV ASDIRECTED [facility]


   Last Admin: 04/30/20 22:55 Dose:  75 mls/hr


Potassium Chloride 20 meq/Lidocaine HCl 2 ml/ Sodium Chloride  112 mls @ 56 mls/

hr IV Q2H [facility]


   Stop: 04/30/20 13:59


   Last Admin: 04/30/20 12:01 Dose:  56 mls/hr


Sodium Chloride (Normal Saline)  250 mls @ 250 mls/hr IV ONETIME ONE


   Stop: 05/02/20 00:27


   Last Admin: 05/01/20 23:50 Dose:  250 mls/hr


Sodium Chloride (Normal Saline)  1,000 mls @ 500 mls/hr IV ASDIRECTED [facility]


   Last Admin: 05/02/20 11:31 Dose:  500 mls/hr


Sodium Chloride (Normal Saline)  1,000 mls @ 250 mls/hr IV ASDIRECTED [facility]


   Stop: 05/02/20 18:45


   Last Admin: 05/02/20 14:58 Dose:  250 mls/hr


Sodium Chloride (Normal Saline)  1,000 mls @ 125 mls/hr IV ASDIRECTED [facility]


Sodium Chloride (Normal Saline)  1,000 mls @ 125 mls/hr IV ASDIRECTED [facility]


   Stop: 05/03/20 03:30


   Last Admin: 05/02/20 19:59 Dose:  125 mls/hr


Vancomycin HCl 1 gm/ Sodium (Chloride)  250 mls @ 167 mls/hr IV Q12H [facility]


   Last Admin: 05/04/20 23:11 Dose:  167 mls/hr


Norepinephrine Bitartrate 4 mg (/ Dextrose/Water)  250 mls @ 7.5 mls/hr IV 

TITRATE [facility]; Protocol


   Last Titration: 05/04/20 17:07 Dose:  0 mcg/min, 0 mls/hr


Potassium Chloride 20 meq/Lidocaine HCl 2 ml/ Sodium Chloride  112 mls @ 56 mls/

hr IV Q2H [facility]


   Stop: 05/03/20 17:59


   Last Admin: 05/03/20 16:57 Dose:  56 mls/hr


Lactated Ringer's (Ringers, Lactated)  1,000 ml IRR .STK-MED ONE


   Stop: 04/26/20 09:16


   Last Admin: 04/26/20 09:15 Dose:  1,000 ml


Lidocaine HCl (Xylocaine 2% Jelly)  10 ml MUCMEM ONETIME ONE


   Stop: 05/02/20 05:53


   Last Admin: 05/02/20 06:13 Dose:  10 ml


Lidocaine/Epinephrine (Xylocaine 1% With Epinephrine 1:100,000) Confirm 

Administered Dose 50 ml .ROUTE .STK-MED ONE


   Stop: 04/26/20 08:35


   Last Admin: 04/26/20 09:29 Dose:  20 ml


Neostigmine Methylsulfate (Neostigmine) Confirm Administered Dose 5 mg .ROUTE 

.STK-MED ONE


   Stop: 04/26/20 08:42


Ondansetron HCl (Zofran) Confirm Administered Dose 4 mg .ROUTE .STK-MED ONE


   Stop: 04/26/20 08:42


Pantoprazole Sodium (Protonix***)  40 mg PO ACBREAKFAST [facility]


   Last Admin: 04/27/20 08:20 Dose:  40 mg


Phenylephrine HCl (Niraj-Synephrine) Confirm Administered Dose 10 mg .ROUTE .STK-

MED ONE


   Stop: 04/26/20 09:05


Piperacillin Sod/Tazobactam Sod (Zosyn) Confirm Administered Dose 4.5 gm .ROUTE 

.STK-MED ONE


   Stop: 04/26/20 06:31


   Last Admin: 04/26/20 06:39 Dose:  Not Given


Potassium Chloride (Potassium Chloride)  40 meq PO ONETIME ONE


   Stop: 05/03/20 14:01


   Last Admin: 05/03/20 13:49 Dose:  40 meq


Propofol (Diprivan  20 Ml) Confirm Administered Dose 200 mg .ROUTE .STK-MED ONE


   Stop: 04/26/20 08:42


Rocuronium Bromide (Zemuron) Confirm Administered Dose 50 mg .ROUTE .STK-MED ONE


   Stop: 04/26/20 08:42


Succinylcholine Chloride (Quelicin) Confirm Administered Dose 200 mg .ROUTE .STK

-MED ONE


   Stop: 04/26/20 08:42


Vancomycin HCl (Vancomycin)  1 gm IV .PHARMACY TO DOSE [facility]


   Stop: 05/03/20 09:01











- Exam


Quality Assessment: Supplemental Oxygen


General: Alert, No Acute Distress


HEENT: Pupils Equal


Lungs: Normal Respiratory Effort, Crackles (few right lung base)


Cardiovascular: Regular Rate, Regular Rhythm


GI/Abdominal Exam: Normal Bowel Sounds, Soft, No Distention


Extremities: No Pedal Edema.  No: Increased Warmth


Skin: Warm, Dry


Psy/Mental Status: Alert.  No: Agitated





Sepsis Event Note





- Evaluation


Sepsis Screening Result: No Definite Risk





- Focused Exam


Vital Signs: 


 Vital Signs











  Temp Pulse Resp BP Pulse Ox


 


 05/05/20 09:00  36.7 C  101 H  22 H  108/62  93 L


 


 05/05/20 08:00  36.7 C  102 H  22 H  101/61  95


 


 05/05/20 07:00  36.6 C  105 H  20  107/59 L  96


 


 05/05/20 06:00    26 H  112/74  95


 


 05/05/20 05:00    24 H  109/69  93 L


 


 05/05/20 04:00  36.6 C   21 H  105/60  95


 


 05/05/20 03:00    22 H  111/61  96


 


 05/05/20 02:00    22 H  96/65  95


 


 05/05/20 01:00    21 H  105/66  91 L


 


 05/05/20 00:00  36.4 C   18  106/47 L  93 L


 


 05/04/20 23:00    22 H  107/59 L  93 L


 


 05/04/20 22:00    27 H  93/51 L  92 L











Date Exam was Performed: 05/05/20


Time Exam was Performed: 12:36





- Problem List & Annotations


(1) Septic shock due to Escherichia coli


SNOMED Code(s): 46266206


   Code(s): A41.51 - SEPSIS DUE TO ESCHERICHIA COLI [E. COLI]; R65.21 - SEVERE 

SEPSIS WITH SEPTIC SHOCK   Status: Acute   Current Visit: Yes   





(2) Severe sepsis


SNOMED Code(s): 21668208


   Code(s): A41.9 - SEPSIS, UNSPECIFIED ORGANISM; R65.20 - SEVERE SEPSIS 

WITHOUT SEPTIC SHOCK   Status: Acute   Current Visit: Yes   





(3) Hypokalemia


SNOMED Code(s): 01405379


   Code(s): E87.6 - HYPOKALEMIA   Status: Acute   Current Visit: Yes   





(4) Down's syndrome


SNOMED Code(s): 77870797


   Code(s): Q90.9 - DOWN SYNDROME, UNSPECIFIED   Status: Chronic   Current Visit

: No   





(5) Acute cholecystitis


SNOMED Code(s): 78666216


   Code(s): K81.0 - ACUTE CHOLECYSTITIS   Status: Acute   Current Visit: Yes   





- Problem List Review


Problem List Initiated/Reviewed/Updated: Yes





- My Orders


Last 24 Hours: 


My Active Orders





05/04/20 09:12


Weight Daily [Height and Weight] [RC] DAILY 





05/04/20 10:47


Dimethicone/Zinc Oxide [Rash Relief-Zinc Oxide Spray]   1 gm TOP ASDIRECTED PRN 














- Plan


Plan:: 





ASSESSMENT AND PLAN  





Pneumonia, suspect aspiration-complicated by acute respiratory failure with 

hypoxia and recurrent hypotension/septic shock.  Large infiltrate noted on 

chest x-ray.  Respiratory status is stable on 3 L.  He is now off of 

vasopressor support.  White blood cell count has improved further but not quite 

normalized.  Seems to be slowly improving.  Family is not keen on escalating 

cares any further than what we are providing at this point.


-Continue broad-spectrum IV antibiotic therapy with Zosyn, and levofloxacin


-Discontinue vancomycin


-As needed nebulizers


-Follow-up cultures


-Supplemental oxygen as needed





Acute cholecystitis with septic shock-status post laparoscopic cholecystectomy.

  Poor oral intake but otherwise doing okay from a surgical standpoint.


-Saline lock IV


-Nutritional supplement 4 times daily





Hypokalemia-improved with supplementation.


-Continue to monitor





Down syndrome-significant disability at this time.  Difficulty with 

communication.


-Continue home medications





Maintenance issues - 


- DVT prophylaxis -[place] apparent


- Nutrition -regular diet with supplements





Disposition-anticipate discharge back to assisted living facility.  Family 

conference is planned for this afternoon.





Florian Keita MD

## 2020-05-05 NOTE — PCM.SN.2
- Free Text/Narrative


Note: 





There was a phone conference this afternoon with 2 sisters (Sakina and Ximena).  

They were updated about his current condition.  We did discuss his current 

level of care and some potential scenarios that we may encounter as the 

hospital stay progresses.  They feel that any escalation of cares beyond the 

current level would not be in accordance with his wishes given his poor quality 

of life.  They did feel that continuing antibiotics at least in the short-term 

was acceptable.  They did not wish for him to have any advanced scans such as 

CT scans performed and did not wish for him to be put through additional blood 

draws, noninvasive ventilation or receive vasopressor support from 

norepinephrine.  They wished for aggressive titration of pain medications to 

keep him comfortable.  They do understand that he is very ill and may not 

recover from this but would like him to be comfortable.  He has not been eating 

very much and is very weak.  They wish for transition to hospice care after 

hospital discharge.





Florian Keita MD

## 2020-05-06 RX ADMIN — Medication SCH CAP: at 20:21

## 2020-05-06 RX ADMIN — Medication SCH CAP: at 08:38

## 2020-05-06 RX ADMIN — LEVOFLOXACIN SCH MLS/HR: 750 INJECTION, SOLUTION INTRAVENOUS at 07:53

## 2020-05-06 RX ADMIN — TAZOBACTAM SODIUM AND PIPERACILLIN SODIUM SCH MLS/HR: 375; 3 INJECTION, SOLUTION INTRAVENOUS at 03:39

## 2020-05-06 RX ADMIN — POTASSIUM CHLORIDE SCH MEQ: 750 CAPSULE, EXTENDED RELEASE ORAL at 08:39

## 2020-05-06 NOTE — PCM.PN
- General Info


Date of Service: 05/06/20


Subjective Update: 





There were no acute events overnight.  The patient continues to to be lethargic 

and does not respond to any questions this morning.  He continues to require 3 

L of supplemental oxygen.  He appears comfortable.  Very minimal intake.


Functional Status: Reports: Pain Controlled, Other (lethargic )





- Review of Systems


General: Denies: Fever





- Patient Data


Vitals - Most Recent: 


 Last Vital Signs











Temp  36.2 C   05/06/20 07:44


 


Pulse  90   05/06/20 07:44


 


Resp  22 H  05/06/20 07:44


 


BP  116/68   05/06/20 07:44


 


Pulse Ox  90 L  05/06/20 07:44











Weight - Most Recent: 72.575 kg


I&O - Last 24 Hours: 


 Intake & Output











 05/05/20 05/06/20 05/06/20





 22:59 06:59 14:59


 


Intake Total 320 50 


 


Balance 320 50 











Roberto Results Last 24 Hours: 


 Microbiology











 05/03/20 10:45 Aerobic Blood Culture - Preliminary





 Blood - Arm, Left    NO GROWTH AFTER 2 DAYS





 Anaerobic Blood Culture - Preliminary





    NO GROWTH AFTER 2 DAYS


 


 05/03/20 10:36 Aerobic Blood Culture - Preliminary





 Blood - Arm, Right    NO GROWTH AFTER 2 DAYS





 Anaerobic Blood Culture - Preliminary





    NO GROWTH AFTER 2 DAYS











Med Orders - Current: 


 Current Medications





Dimethicone/Zinc Oxide (Rash Relief-Zinc Oxide Spray)  1 gm TOP ASDIRECTED PRN


   PRN Reason: Rash


   Last Admin: 05/05/20 00:34 Dose:  1 applic


Docusate Sodium (Colace)  100 mg PO BID PRN


   PRN Reason: Constipation


Donepezil HCl (Aricept)  10 mg PO DAILY [facility]


   Last Admin: 05/06/20 08:38 Dose:  10 mg


Hydrocortisone (Hydrocortisone 1% Crm)  0 gm TOP BID PRN


   PRN Reason: Rash


Lactobacillus Rhamnosus (Culturelle)  1 cap PO BID [facility]


   Last Admin: 05/06/20 08:38 Dose:  1 cap


Levetiracetam (Keppra)  750 mg PO BID [facility]


   Last Admin: 05/06/20 08:39 Dose:  750 mg


Lidocaine HCl (Xylocaine 2% Jelly)  10 ml MUCMEM BID PRN


   PRN Reason: Other


Morphine Sulfate (Morphine 10 Mg/0.5 Ml Oral Syringe)  5 mg PO Q2H PRN


   PRN Reason: Pain


Ondansetron HCl (Zofran Odt)  4 mg PO Q4H PRN


   PRN Reason: Nausea/Vomiting


Ondansetron HCl (Zofran)  4 mg IVPUSH Q4H PRN


   PRN Reason: Nausea/Vomiting


Oxcarbazepine (Trileptal)  150 mg PO BID [facility]


   Last Admin: 05/06/20 08:40 Dose:  150 mg


Potassium Chloride (Potassium Chloride)  10 meq PO DAILY [facility]


   Last Admin: 05/06/20 08:39 Dose:  10 meq


Quetiapine Fumarate (Seroquel)  50 mg PO TID [facility]


   Last Admin: 05/06/20 08:41 Dose:  50 mg


Sertraline HCl (Zoloft)  50 mg PO DAILY [facility]


   Last Admin: 05/06/20 08:40 Dose:  50 mg


Zolpidem Tartrate (Ambien)  5 mg PO BEDTIME PRN


   PRN Reason: Sleep


   Last Admin: 05/05/20 20:01 Dose:  5 mg





Discontinued Medications





Acetaminophen (Tylenol)  650 mg RECTAL NOW ONE


   Stop: 04/26/20 05:59


   Last Admin: 04/26/20 06:03 Dose:  650 mg


Hydrocodone Bitart/Acetaminophen (Norco 325-5 Mg)  1 tab PO Q4H PRN


   PRN Reason: Pain (moderate 4-6)


   Last Admin: 05/04/20 19:45 Dose:  1 tab


Albuterol/Ipratropium (Duoneb 3.0-0.5 Mg/3 Ml)  3 ml NEB Q4H PRN


   PRN Reason: Dyspnea


   Last Admin: 05/04/20 17:30 Dose:  3 ml


Amoxicillin/Clavulanate Potassium (Augmentin 875 Mg/125 Mg)  1 tab PO BID [facility]


   Last Admin: 05/03/20 08:04 Dose:  1 tab


Benzocaine/Menthol (Cepacol Sore Throat)  1 lozenge MUCMEM Q4H PRN


   PRN Reason: Sore Throat


Bupivacaine HCl (Marcaine 0.5%) Confirm Administered Dose 50 ml .ROUTE .STK-MED 

ONE


   Stop: 04/26/20 08:35


   Last Admin: 04/26/20 09:29 Dose:  20 ml


Ropivacaine 36 ml/Dexamethasone 8 mg/Epinephrine HCl 0.4 mg/ Sodium Chloride 

41.6 ml  0 ml NERVRT ASDIRECTED [facility]


   Last Admin: 04/26/20 09:35 Dose:  80 syringe


Dexamethasone (Dexamethasone) Confirm Administered Dose 4 mg .ROUTE .STK-MED ONE


   Stop: 04/26/20 08:42


Enoxaparin Sodium (Lovenox)  40 mg SUBCUT DAILY [facility]


   Last Admin: 04/28/20 08:54 Dose:  40 mg


Enoxaparin Sodium (Lovenox)  40 mg SUBCUT DAILY [facility]


   Last Admin: 05/05/20 08:33 Dose:  40 mg


Fentanyl (Sublimaze) Confirm Administered Dose 250 mcg .ROUTE .STK-MED ONE


   Stop: 04/26/20 08:42


Furosemide (Lasix)  20 mg IVPUSH ONETIME ONE


   Stop: 05/02/20 23:16


   Last Admin: 05/02/20 23:54 Dose:  20 mg


Furosemide (Lasix)  20 mg IVPUSH ONETIME ONE


   Stop: 05/04/20 09:13


   Last Admin: 05/04/20 09:57 Dose:  20 mg


Glycopyrrolate (Robinul) Confirm Administered Dose 1 mg .ROUTE .[facility]-MED ONE


   Stop: 04/26/20 08:42


Hydroxyzine HCl (Vistaril)  100 mg IM Q4H PRN


   PRN Reason: Breakthrough Pain


Sodium Chloride (Normal Saline)  1,000 mls @ 999 mls/hr IV ASDIRECTED [facility]


   Last Admin: 04/26/20 05:40 Dose:  999 mls/hr


Sodium Chloride (Normal Saline)  1,000 mls @ 999 mls/hr IV ASDIRECTED [facility]


   Last Admin: 04/26/20 06:30 Dose:  999 mls/hr


Piperacillin Sod/Tazobactam (Sod 4.5 gm/ Sodium Chloride)  100 mls @ 100 mls/hr 

IV ONETIME ONE


   Stop: 04/26/20 07:16


   Last Admin: 04/26/20 06:35 Dose:  100 mls/hr


Aztreonam 1 gm/ Sodium (Chloride)  50 mls @ 100 mls/hr IV ONETIME ONE


   Stop: 04/26/20 07:01


   Last Admin: 04/26/20 06:53 Dose:  100 mls/hr


Sodium Chloride (Normal Saline) Confirm Administered Dose 100 mls @ as directed 

.ROUTE .STK-MED ONE


   Stop: 04/26/20 06:33


   Last Admin: 04/26/20 06:40 Dose:  Not Given


Sodium Chloride (Normal Saline) Confirm Administered Dose 100 mls @ as directed 

.ROUTE .[facility]-MED ONE


   Stop: 04/26/20 06:34


   Last Admin: 04/26/20 06:40 Dose:  Not Given


Sodium Chloride (Normal Saline)  1,000 mls @ 500 mls/hr IV ASDIRECTED [facility]


   Last Admin: 04/26/20 07:36 Dose:  500 mls/hr


Lactated Ringer's (Ringers, Lactated) Confirm Administered Dose 1,000 mls @ as 

directed .ROUTE .[facility]-MED ONE


   Stop: 04/26/20 09:01


Sodium Chloride (Normal Saline) Confirm Administered Dose 10 mls @ as directed 

.ROUTE .[facility]-MED ONE


   Stop: 04/26/20 09:05


Lactated Ringer's (Ringers, Lactated) Confirm Administered Dose 1,000 mls @ as 

directed .ROUTE .[facility] ONE


   Stop: 04/26/20 09:08


Lactated Ringer's (Ringers, Lactated) Confirm Administered Dose 1,000 mls @ as 

directed .ROUTE .[facility] ONE


   Stop: 04/26/20 09:38


Piperacillin/Tazobactam/ (Dextrose 3.375 gm/ Premix)  50 mls @ 100 mls/hr IV 

Q6H [facility]


   Last Admin: 04/28/20 07:56 Dose:  100 mls/hr


Potassium Cl/Dextrose/Lact Ringer's (D5 Lr With 20 Meq Kcl)  1,000 mls @ 125 mls

/hr IV ASDIRECTED [facility]


   Last Admin: 04/26/20 21:54 Dose:  125 mls/hr


Vancomycin HCl 1 gm/ Sodium (Chloride)  250 mls @ 166.667 mls/hr IV Q12H [facility]


   Last Admin: 04/28/20 00:19 Dose:  166.667 mls/hr


Norepinephrine Bitartrate 4 mg (/ Dextrose/Water)  250 mls @ 7.5 mls/hr IV 

TITRATE [facility]; Protocol


   Last Titration: 04/28/20 02:00 Dose:  0 mcg/min, 0 mls/hr


Potassium Cl/Dextrose/Lact Ringer's (D5 Lr With 20 Meq Kcl)  1,000 mls @ 75 mls/

hr IV ASDIRECTED [facility]


   Last Admin: 04/27/20 18:42 Dose:  75 mls/hr


Magnesium Sulfate 2 gm/ Premix  50 mls @ 25 mls/hr IV Q6H [facility]


   Stop: 04/28/20 17:59


   Last Admin: 04/28/20 16:02 Dose:  25 mls/hr


Dextrose/Lactated Ringer's (Dextrose 5%-Lactated Ringers)  1,000 mls @ 150 mls/

hr IV ASDIRECTED [facility]


   Last Admin: 04/29/20 07:23 Dose:  150 mls/hr


Potassium Chloride 20 meq/Lidocaine HCl 2 ml/ Sodium Chloride  112 mls @ 56 mls/

hr IV Q2H [facility]


   Stop: 04/28/20 17:59


   Last Admin: 04/28/20 16:57 Dose:  56 mls/hr


Ceftazidime 1 gm/ Sodium (Chloride)  50 mls @ 100 mls/hr IV Q8HR [facility]


   Last Admin: 04/28/20 13:58 Dose:  100 mls/hr


Lactated Ringer's (Ringers, Lactated)  1,000 mls @ 500 mls/hr IV ASDIRECTED [facility]


   Stop: 04/28/20 15:59


   Last Admin: 04/28/20 14:03 Dose:  500 mls/hr


Levofloxacin/Dextrose 750 mg/ (Premix)  150 mls @ 100 mls/hr IV Q24H [facility]


   Last Admin: 05/01/20 16:48 Dose:  100 mls/hr


Piperacillin/Tazobactam/ (Dextrose 3.375 gm/ Premix)  50 mls @ 100 mls/hr IV 

Q6H [facility]


   Last Admin: 05/02/20 09:16 Dose:  100 mls/hr


Dextrose/Lactated Ringer's (Dextrose 5%-Lactated Ringers)  1,000 mls @ 75 mls/

hr IV ASDIRECTED [facility]


   Last Admin: 04/30/20 22:55 Dose:  75 mls/hr


Potassium Chloride 20 meq/Lidocaine HCl 2 ml/ Sodium Chloride  112 mls @ 56 mls/

hr IV Q2H [facility]


   Stop: 04/30/20 13:59


   Last Admin: 04/30/20 12:01 Dose:  56 mls/hr


Sodium Chloride (Normal Saline)  250 mls @ 250 mls/hr IV ONETIME ONE


   Stop: 05/02/20 00:27


   Last Admin: 05/01/20 23:50 Dose:  250 mls/hr


Sodium Chloride (Normal Saline)  1,000 mls @ 500 mls/hr IV ASDIRECTED [facility]


   Last Admin: 05/02/20 11:31 Dose:  500 mls/hr


Sodium Chloride (Normal Saline)  1,000 mls @ 250 mls/hr IV ASDIRECTED [facility]


   Stop: 05/02/20 18:45


   Last Admin: 05/02/20 14:58 Dose:  250 mls/hr


Sodium Chloride (Normal Saline)  1,000 mls @ 125 mls/hr IV ASDIRECTED [facility]


Sodium Chloride (Normal Saline)  1,000 mls @ 125 mls/hr IV ASDIRECTED [facility]


   Stop: 05/03/20 03:30


   Last Admin: 05/02/20 19:59 Dose:  125 mls/hr


Levofloxacin/Dextrose 750 mg/ (Premix)  150 mls @ 100 mls/hr IV Q24H [facility]


   Last Admin: 05/06/20 07:53 Dose:  100 mls/hr


Piperacillin/Tazobactam/ (Dextrose 3.375 gm/ Premix)  50 mls @ 100 mls/hr IV 

Q6HR [facility]


   Last Admin: 05/06/20 03:39 Dose:  100 mls/hr


Vancomycin HCl 1 gm/ Sodium (Chloride)  250 mls @ 167 mls/hr IV Q12H [facility]


   Last Admin: 05/04/20 23:11 Dose:  167 mls/hr


Norepinephrine Bitartrate 4 mg (/ Dextrose/Water)  250 mls @ 7.5 mls/hr IV 

TITRATE [facility]; Protocol


   Last Titration: 05/04/20 17:07 Dose:  0 mcg/min, 0 mls/hr


Potassium Chloride 20 meq/Lidocaine HCl 2 ml/ Sodium Chloride  112 mls @ 56 mls/

hr IV Q2H [facility]


   Stop: 05/03/20 17:59


   Last Admin: 05/03/20 16:57 Dose:  56 mls/hr


Lactated Ringer's (Ringers, Lactated)  1,000 ml IRR .STK-MED ONE


   Stop: 04/26/20 09:16


   Last Admin: 04/26/20 09:15 Dose:  1,000 ml


Lidocaine HCl (Xylocaine 2% Jelly)  10 ml MUCMEM ONETIME ONE


   Stop: 05/02/20 05:53


   Last Admin: 05/02/20 06:13 Dose:  10 ml


Lidocaine/Epinephrine (Xylocaine 1% With Epinephrine 1:100,000) Confirm 

Administered Dose 50 ml .ROUTE .STK-MED ONE


   Stop: 04/26/20 08:35


   Last Admin: 04/26/20 09:29 Dose:  20 ml


Magnesium Oxide (Magnesium Oxide)  400 mg PO BID [facility]


   Last Admin: 05/05/20 08:35 Dose:  400 mg


Neostigmine Methylsulfate (Neostigmine) Confirm Administered Dose 5 mg .ROUTE 

.STK-MED ONE


   Stop: 04/26/20 08:42


Ondansetron HCl (Zofran) Confirm Administered Dose 4 mg .ROUTE .STK-MED ONE


   Stop: 04/26/20 08:42


Pantoprazole Sodium (Protonix***)  40 mg PO ACBREAKFAST [facility]


   Last Admin: 04/27/20 08:20 Dose:  40 mg


Pantoprazole Sodium (Protonix Granules***)  40 mg PO DAILY@0730 [facility]


   Last Admin: 05/05/20 08:35 Dose:  40 mg


Phenylephrine HCl (Niraj-Synephrine) Confirm Administered Dose 10 mg .ROUTE .STK-

MED ONE


   Stop: 04/26/20 09:05


Piperacillin Sod/Tazobactam Sod (Zosyn) Confirm Administered Dose 4.5 gm .ROUTE 

.STK-MED ONE


   Stop: 04/26/20 06:31


   Last Admin: 04/26/20 06:39 Dose:  Not Given


Potassium Chloride (Potassium Chloride)  40 meq PO ONETIME ONE


   Stop: 05/03/20 14:01


   Last Admin: 05/03/20 13:49 Dose:  40 meq


Propofol (Diprivan  20 Ml) Confirm Administered Dose 200 mg .ROUTE .STK-MED ONE


   Stop: 04/26/20 08:42


Rocuronium Bromide (Zemuron) Confirm Administered Dose 50 mg .ROUTE .STK-MED ONE


   Stop: 04/26/20 08:42


Succinylcholine Chloride (Quelicin) Confirm Administered Dose 200 mg .ROUTE .STK

-MED ONE


   Stop: 04/26/20 08:42


Vancomycin HCl (Vancomycin)  1 gm IV .PHARMACY TO DOSE [facility]


   Stop: 05/03/20 09:01











- Exam


Quality Assessment: Supplemental Oxygen


General: No Acute Distress, Lethargic.  No: Alert


Lungs: Normal Respiratory Effort, Crackles (few right lower and mid lung)


Cardiovascular: Regular Rate, Regular Rhythm


GI/Abdominal Exam: Soft, No Distention


Extremities: Pedal Edema


Psy/Mental Status: Alert, Normal Affect





Sepsis Event Note





- Evaluation


Sepsis Screening Result: No Definite Risk





- Focused Exam


Vital Signs: 


 Vital Signs











  Temp Pulse Resp BP Pulse Ox


 


 05/06/20 07:44  36.2 C  90  22 H  116/68  90 L


 


 05/06/20 03:41  36.9 C  94  16  113/65  94 L


 


 05/06/20 00:00  36.8 C  111 H  18  114/60  93 L











Date Exam was Performed: 05/06/20


Time Exam was Performed: 13:26





- Problem List & Annotations


(1) Septic shock due to Escherichia coli


SNOMED Code(s): 84477888


   Code(s): A41.51 - SEPSIS DUE TO ESCHERICHIA COLI [E. COLI]; R65.21 - SEVERE 

SEPSIS WITH SEPTIC SHOCK   Status: Acute   Current Visit: Yes   





(2) Severe sepsis


SNOMED Code(s): 09434194


   Code(s): A41.9 - SEPSIS, UNSPECIFIED ORGANISM; R65.20 - SEVERE SEPSIS 

WITHOUT SEPTIC SHOCK   Status: Acute   Current Visit: Yes   





(3) Hypokalemia


SNOMED Code(s): 85052934


   Code(s): E87.6 - HYPOKALEMIA   Status: Acute   Current Visit: Yes   





(4) Down's syndrome


SNOMED Code(s): 35093518


   Code(s): Q90.9 - DOWN SYNDROME, UNSPECIFIED   Status: Chronic   Current Visit

: No   





(5) Acute cholecystitis


SNOMED Code(s): 64789243


   Code(s): K81.0 - ACUTE CHOLECYSTITIS   Status: Acute   Current Visit: Yes   





- Problem List Review


Problem List Initiated/Reviewed/Updated: Yes





- My Orders


Last 24 Hours: 


My Active Orders





05/05/20 15:56


Transfer Patient (Change bed) [ADT] Routine 





05/05/20 15:57


Morphine [Morphine 10 MG/0.5 ML Oral Syringe]   5 mg PO Q2H PRN 





05/06/20 09:35


Antiembolic Devices [RC] .Routine 


Furosemide [Lasix]   20 mg IVPUSH NOW ONE 


TROY Hose [Antiembolic Hose] [OM.PC] Routine 





05/07/20 08:00


levoFLOXacin [Levaquin]   750 mg PO Q24H 














- Plan


Plan:: 





ASSESSMENT AND PLAN  





Pneumonia, suspect aspiration-complicated by acute respiratory failure with 

hypoxia and recurrent hypotension/septic shock.  Large infiltrate noted on 

chest x-ray.  Respiratory status is stable on 3 L.  He may have some mild 

excess volume from the sepsis resuscitation.  We are moving more towards a 

limited care plan but continuing oxygen and antibiotic therapy.


-Continue levofloxacin


-Dose of furosemide this morning


-TROY stockings


-As needed nebulizers


-Supplemental oxygen as needed





Acute cholecystitis with septic shock-status post laparoscopic cholecystectomy.

  Poor oral intake.  No significant pain.


-Saline lock IV


-Nutritional supplement 4 times daily





Down syndrome-significant disability at this time.  Difficulty with 

communication.


-Continue home medications





Maintenance issues - 


- DVT prophylaxis -mechanical


- Nutrition -regular diet with supplements





Disposition-anticipate discharge back to assisted living facility.  Family 

conference was held on 5/5.  We are moving towards a limited treatment plan but 

continuing antibiotics.  Our goal is to get him well enough to get back to his 

assisted living facility and he will transition to hospice at that time.  I 

would anticipate discharge in 1 or maybe 2 days.





Florian Keita MD

## 2020-05-07 RX ADMIN — Medication SCH CAP: at 09:33

## 2020-05-07 RX ADMIN — POTASSIUM CHLORIDE SCH MEQ: 750 CAPSULE, EXTENDED RELEASE ORAL at 09:32

## 2020-05-07 NOTE — [id number]
**Discharge Summary





- Hospital Course


Brief History: 60-year-old male with history of Down syndrome and seizure 

disorder who presented with abdominal pain, lethargy and fever.  He was 

admitted for management of acute cholecystitis.


Diagnosis: Stroke: No





- Discharge Data


Discharge Date: 05/07/20


Discharge Disposition: DC/Tfer to Hospice - Home 50


Condition: Stable





- Referral to Home Health


Primary Care Physician: 


PCP None








- Discharge Diagnosis/Problem(s)


(1) Acute cholecystitis


SNOMED Code(s): 93010345


   ICD Code: K81.0 - ACUTE CHOLECYSTITIS   Status: Acute   





(2) Septic shock due to Escherichia coli


SNOMED Code(s): 39919517


   ICD Code: A41.51 - SEPSIS DUE TO ESCHERICHIA COLI [E. COLI]; R65.21 - SEVERE 

SEPSIS WITH SEPTIC SHOCK   Status: Acute   





(3) Severe sepsis


SNOMED Code(s): 41417299


   ICD Code: A41.9 - SEPSIS, UNSPECIFIED ORGANISM; R65.20 - SEVERE SEPSIS 

WITHOUT SEPTIC SHOCK   Status: Acute   





(4) Hypokalemia


SNOMED Code(s): 40117034


   ICD Code: E87.6 - HYPOKALEMIA   Status: Acute   





(5) Down's syndrome


SNOMED Code(s): 94530209


   ICD Code: Q90.9 - DOWN SYNDROME, UNSPECIFIED   Status: Chronic   





(6) Aspiration pneumonia


SNOMED Code(s): 510376018


   ICD Code: J69.0 - PNEUMONITIS DUE TO INHALATION OF FOOD AND VOMIT   Status: 

Acute   


Qualifiers: 


   Aspiration pneumonia type: unspecified   Laterality: bilateral   Lung 

location: lower lobe of lung   Qualified Code(s): J69.0 - Pneumonitis due to 

inhalation of food and vomit   





- Patient Summary/Data


Consults: 


 Consultations





04/26/20 08:42


Nutrition Reassessment/Plan, Adult [Consult to Dietician] [CONS] Routine 


   Comment: 


   Physician Instructions: 


   Quantity: 











Hospital Course: 





Trae presented to the emergency room with about 2 weeks of progressive weakness

, lethargy and a new onset of fever.  Work-up in the emergency room was 

suggestive of acute cholecystitis with evidence for sepsis.  He was started on 

antibiotics and taken to the operating room for surgical intervention.  He had 

an uneventful laparoscopic cholecystectomy with a very ill appearing 

gallbladder.  Postoperatively he continued to be hypotensive despite aggressive 

volume resuscitation in the operating room in the emergency room.  He was 

admitted to the intensive care unit.  We did have to start him on vasopressors 

fairly quickly because of the persistent hypotension.  He was on broad-spectrum 

antibiotics.  Shortly after admission all 4 of his blood cultures from the 

emergency room came back positive for gram-negative rods.  He remained on the 

broad-spectrum antibiotic therapy over the next couple of days.  His blood 

cultures did eventually returned with pansensitive E. coli.  We were slowly 

able to wean him off the norepinephrine.  After several days in the intensive 

care unit he had made enough progress that he was safe for transfer out of the 

intensive care unit.  His strength and appetite had both been improving.  He 

has been afebrile for several days.  Unfortunately about 5 days after admission 

he had trouble with swallowing pills and there was suspicion for aspiration.  

The next day his respiratory status started to decline with increasing 

supplemental oxygen requirements and decreasing blood pressure.  He was 

transferred back to the intensive care unit and started on broad-spectrum IV 

antibiotic therapy.  Repeat blood cultures were obtained.  He did require 

norepinephrine for vasopressor support.  Over the next couple of days we were 

slowly able to turn down the norepinephrine.  His cultures have been negative 

so we de-escalated antibiotics.  Over the course of the last several days of 

the hospitalization multiple conversations were held with his family.  They do 

not feel that given his poor quality of life and especially with his recent 

decline that aggressive interventions or cares would be fitting with his 

wishes.  The patient appears to have recovered from the septic shock portion of 

the infection but he remains very weak and very lethargic.  His oral intake has 

been very minimal.  Family has expressed desire to transition to comfort cares 

at this time given his significant disabilities now even more difficult because 

of his severe infections.  We have stabilized the patient enough that he can be 

transferred back to his assisted living facility.  He will be receiving hospice 

care along with his nursing care at the assisted living facility.  I would 

suspect that his condition will deteriorate further over the next couple of 

weeks because he is not eating or drinking very well.





- Patient Instructions


Diet: Regular Diet as Tolerated


Activity: As Tolerated


Showering/Bathing: May Shower


Notify Provider of: Fever, Increased Pain


Other/Special Instructions: 1. You were in the hospital for management of acute 

cholecystitis which was complicated by septic shock caused by E. coli.  Your 

hospital stay was further complicated by aspiration pneumonia with acute 

respiratory failure as well as recurrent septic shock.  Your infections have 

been improving with antibiotic therapy but unfortunately the severity of the 

infections have taken a toll on your body.  We are transitioning to hospice 

care is to maximize comfort at the time of hospital discharge.  2. Continue 

your usual home medications as previously prescribed.  3. I have provided 

prescriptions for morphine to help control pain and air hunger as well as 

Lorazepam for anxiety/agitation.  4.  You should continue to use oxygen at 3 L/

min with the goal to keep your oxygen saturations greater than 90%.





- Discharge Plan


*PRESCRIPTION DRUG MONITORING PROGRAM REVIEWED*: Not Applicable


*COPY OF PRESCRIPTION DRUG MONITORING REPORT IN PATIENT PRUDENCE: Not Applicable


Prescriptions/Med Rec: 


LORazepam [LORazepam Intensol] 0.5 mg PO Q2H PRN #30 ml


 PRN Reason: Anxiety/Agitation


Morphine [Morphine 20 MG/ML Soln] 5 mg PO Q2H PRN #60 ml


 PRN Reason: Pain


predniSONE 40 mg PO WITHBREAKFAST #6 tablet


Home Medications: 


 Home Meds





Acetaminophen 500 mg PO TID 08/13/19 [History]


Calcium Carbonate [Calcium] 600 mg PO DAILY 08/13/19 [History]


Donepezil HCl 10 mg PO DAILY 08/13/19 [History]


Multivitamin with Minerals [Multiple Vitamin] 1 tab PO DAILY 08/13/19 [History]


Omeprazole 40 mg PO DAILY 08/13/19 [History]


levETIRAcetam [Levetiracetam ER] 750 mg PO BID 08/13/19 [History]


Hydrocortisone [Hydrocortisone 1% Crm] 1 applic TOP BID PRN 02/12/20 [History]


OXcarbazepine [Oxcarbazepine] 150 mg PO BID 02/12/20 [History]


Sertraline [Zoloft] 50 mg PO DAILY 02/12/20 [History]


Potassium Chloride 10 meq PO DAILY 04/26/20 [History]


LORazepam [LORazepam Intensol] 0.5 mg PO Q2H PRN #30 ml 05/07/20 [Rx]


Morphine [Morphine 20 MG/ML Soln] 5 mg PO Q2H PRN #60 ml 05/07/20 [Rx]


predniSONE 40 mg PO WITHBREAKFAST #6 tablet 05/07/20 [Rx]








Oxygen Therapy Mode: Nasal Cannula


Oxygen Flow Rate (L/min): 3


Patient Handouts:  Aspiration Precautions, Adult





- Discharge Summary/Plan Comment


DC Time >30 min.: Yes (40-coordinating hospice )





- Patient Data


Vitals - Most Recent: 


 Last Vital Signs











Temp  36.6 C   05/07/20 04:00


 


Pulse  84   05/07/20 07:13


 


Resp  20   05/07/20 06:00


 


BP  126/63   05/07/20 07:13


 


Pulse Ox  92 L  05/07/20 07:13











Weight - Most Recent: 72.575 kg


JADON Results - Last 24 hrs: 


 Microbiology











 05/03/20 10:45 Aerobic Blood Culture - Preliminary





 Blood - Arm, Left    NO GROWTH AFTER 3 DAYS





 Anaerobic Blood Culture - Preliminary





    NO GROWTH AFTER 3 DAYS


 


 05/03/20 10:36 Aerobic Blood Culture - Preliminary





 Blood - Arm, Right    NO GROWTH AFTER 3 DAYS





 Anaerobic Blood Culture - Preliminary





    NO GROWTH AFTER 3 DAYS











Med Orders - Current: 


 Current Medications





Dimethicone/Zinc Oxide (Rash Relief-Zinc Oxide Spray)  1 gm TOP ASDIRECTED PRN


   PRN Reason: Rash


   Last Admin: 05/05/20 00:34 Dose:  1 applic


Docusate Sodium (Colace)  100 mg PO BID PRN


   PRN Reason: Constipation


Donepezil HCl (Aricept)  10 mg PO DAILY [facility]


   Last Admin: 05/07/20 09:33 Dose:  10 mg


Hydrocortisone (Hydrocortisone 1% Crm)  0 gm TOP BID PRN


   PRN Reason: Rash


Lactobacillus Rhamnosus (Culturelle)  1 cap PO BID [facility]


   Last Admin: 05/07/20 09:33 Dose:  1 cap


Levetiracetam (Keppra)  750 mg PO BID [facility]


   Last Admin: 05/07/20 09:33 Dose:  750 mg


Levofloxacin (Levaquin)  750 mg PO Q24H [facility]


   Last Admin: 05/07/20 09:33 Dose:  750 mg


Lidocaine HCl (Xylocaine 2% Jelly)  10 ml MUCMEM BID PRN


   PRN Reason: Other


Morphine Sulfate (Morphine 10 Mg/0.5 Ml Oral Syringe)  5 mg PO Q2H PRN


   PRN Reason: Pain


   Last Admin: 05/07/20 03:06 Dose:  5 mg


Ondansetron HCl (Zofran Odt)  4 mg PO Q4H PRN


   PRN Reason: Nausea/Vomiting


Ondansetron HCl (Zofran)  4 mg IVPUSH Q4H PRN


   PRN Reason: Nausea/Vomiting


Oxcarbazepine (Trileptal)  150 mg PO BID [facility]


   Last Admin: 05/07/20 09:32 Dose:  150 mg


Potassium Chloride (Potassium Chloride)  10 meq PO DAILY [facility]


   Last Admin: 05/07/20 09:32 Dose:  10 meq


Prednisone (Prednisone)  40 mg PO WITHBREAKFAST [facility]


Sertraline HCl (Zoloft)  50 mg PO DAILY [facility]


   Last Admin: 05/07/20 09:32 Dose:  50 mg


Zolpidem Tartrate (Ambien)  5 mg PO BEDTIME PRN


   PRN Reason: Sleep


   Last Admin: 05/06/20 20:22 Dose:  5 mg





Discontinued Medications





Acetaminophen (Tylenol)  650 mg RECTAL NOW ONE


   Stop: 04/26/20 05:59


   Last Admin: 04/26/20 06:03 Dose:  650 mg


Hydrocodone Bitart/Acetaminophen (Norco 325-5 Mg)  1 tab PO Q4H PRN


   PRN Reason: Pain (moderate 4-6)


   Last Admin: 05/04/20 19:45 Dose:  1 tab


Albuterol/Ipratropium (Duoneb 3.0-0.5 Mg/3 Ml)  3 ml NEB Q4H PRN


   PRN Reason: Dyspnea


   Last Admin: 05/04/20 17:30 Dose:  3 ml


Amoxicillin/Clavulanate Potassium (Augmentin 875 Mg/125 Mg)  1 tab PO BID [facility]


   Last Admin: 05/03/20 08:04 Dose:  1 tab


Benzocaine/Menthol (Cepacol Sore Throat)  1 lozenge MUCMEM Q4H PRN


   PRN Reason: Sore Throat


Bupivacaine HCl (Marcaine 0.5%) Confirm Administered Dose 50 ml .ROUTE .STK-MED 

ONE


   Stop: 04/26/20 08:35


   Last Admin: 04/26/20 09:29 Dose:  20 ml


Ropivacaine 36 ml/Dexamethasone 8 mg/Epinephrine HCl 0.4 mg/ Sodium Chloride 

41.6 ml  0 ml NERVRT ASDIRECTED [facility]


   Last Admin: 04/26/20 09:35 Dose:  80 syringe


Dexamethasone (Dexamethasone) Confirm Administered Dose 4 mg .ROUTE .STK-MED ONE


   Stop: 04/26/20 08:42


Enoxaparin Sodium (Lovenox)  40 mg SUBCUT DAILY [facility]


   Last Admin: 04/28/20 08:54 Dose:  40 mg


Enoxaparin Sodium (Lovenox)  40 mg SUBCUT DAILY [facility]


   Last Admin: 05/05/20 08:33 Dose:  40 mg


Fentanyl (Sublimaze) Confirm Administered Dose 250 mcg .ROUTE .K-MED ONE


   Stop: 04/26/20 08:42


Furosemide (Lasix)  20 mg IVPUSH ONETIME ONE


   Stop: 05/02/20 23:16


   Last Admin: 05/02/20 23:54 Dose:  20 mg


Furosemide (Lasix)  20 mg IVPUSH ONETIME ONE


   Stop: 05/04/20 09:13


   Last Admin: 05/04/20 09:57 Dose:  20 mg


Furosemide (Lasix)  20 mg IVPUSH NOW ONE


   Stop: 05/06/20 10:31


   Last Admin: 05/06/20 10:11 Dose:  20 mg


Glycopyrrolate (Robinul) Confirm Administered Dose 1 mg .ROUTE .STK-MED ONE


   Stop: 04/26/20 08:42


Hydroxyzine HCl (Vistaril)  100 mg IM Q4H PRN


   PRN Reason: Breakthrough Pain


Sodium Chloride (Normal Saline)  [facility]000 [facility]


   Last Admin: 04/26/20 05:40 Dose:  999 mls/hr


Sodium Chloride (Normal Saline)  1,000 mls [facility]


   Last Admin: 04/26/20 06:30 Dose:  999 mls/hr


Piperacillin Sod/Tazobactam (Sod 4.5 gm/ Sodium Chloride)  100 mls @ 100 mls/hr 

IV ONETIME ONE


   Stop: 04/26/20 07:16


   Last Admin: 04/26/20 06:35 Dose:  100 mls/hr


Aztreonam 1 gm/ Sodium (Chloride)  50 mls @ 100 mls/hr IV ONETIME ONE


   Stop: 04/26/20 07:01


   Last Admin: 04/26/20 06:53 Dose:  100 mls/hr


Sodium Chloride (Normal Saline) Confirm Administered Dose 100 mls @ as directed 

.ROUTE .STK-MED ONE


   Stop: 04/26/20 06:33


   Last Admin: 04/26/20 06:40 Dose:  Not Given


Sodium Chloride (Normal Saline) Confirm Administered Dose 100 mls @ as directed 

.ROUTE .STK-MED ONE


   Stop: 04/26/20 06:34


   Last Admin: 04/26/20 06:40 Dose:  Not Given


Sodium Chloride (Normal Saline)  1,000 mls @ 500 mls/hr IV [facility]


   Last Admin: 04/26/20 07:36 Dose:  500 mls/hr


Lactated Ringer's (Ringers, Lactated) Confirm Administered Dose 1,000 mls @ as 

directed .ROUTE .[facility] ONE


   Stop: 04/26/20 09:01


Sodium Chloride (Normal Saline) Confirm Administered Dose 10 mls @ as directed 

.ROUTE .[facility] ONE


   Stop: 04/26/20 09:05


Lactated Ringer's (Ringers, Lactated) Confirm Administered Dose 1,000 mls @ as 

directed .ROUTE .[facility] ONE


   Stop: 04/26/20 09:08


Lactated Ringer's (Ringers, Lactated) Confirm Administered Dose 1,000 mls @ as 

directed .ROUTE .[facility] ONE


   Stop: 04/26/20 09:38


Piperacillin/Tazobactam/ (Dextrose 3.375 gm/ Premix)  50 mls @ 100 mls/hr IV 

Q6H [facility]


   Last Admin: 04/28/20 07:56 Dose:  100 mls/hr


Potassium Cl/Dextrose/Lact Ringer's (D5 Lr With 20 Meq Kcl)  1,000 mls @ 125 mls

/hr IV ASDIRECTED [facility]


   Last Admin: 04/26/20 21:54 Dose:  125 mls/hr


Vancomycin HCl 1 gm/ Sodium (Chloride)  250 mls @ 166.667 mls/hr IV Q12H [facility]


   Last Admin: 04/28/20 00:19 Dose:  166.667 mls/hr


Norepinephrine Bitartrate 4 mg (/ Dextrose/Water)  250 mls @ 7.5 mls/hr IV 

TITRATE [facility]; Protocol


   Last Titration: 04/28/20 02:00 Dose:  0 mcg/min, 0 mls/hr


Potassium Cl/Dextrose/Lact Ringer's (D5 Lr With 20 Meq Kcl)  1,000 mls @ 75 mls/

hr IV ASDIRECTED [facility]


   Last Admin: 04/27/20 18:42 Dose:  75 mls/hr


Magnesium Sulfate 2 gm/ Premix  50 mls @ 25 mls/hr IV Q6H [facility]


   Stop: 04/28/20 17:59


   Last Admin: 04/28/20 16:02 Dose:  25 mls/hr


Dextrose/Lactated Ringer's (Dextrose 5%-Lactated Ringers)  1,000 mls @ 150 mls/

hr IV ASDIRECTED [facility]


   Last Admin: 04/29/20 07:23 Dose:  150 mls/hr


Potassium Chloride 20 meq/Lidocaine HCl 2 ml/ Sodium Chloride  112 mls @ 56 mls/

hr IV Q2H [facility]


   Stop: 04/28/20 17:59


   Last Admin: 04/28/20 16:57 Dose:  56 mls/hr


Ceftazidime 1 gm/ Sodium (Chloride)  50 mls @ 100 mls/hr IV Q8HR [facility]


   Last Admin: 04/28/20 13:58 Dose:  100 mls/hr


Lactated Ringer's (Ringers, Lactated)  1,000 mls @ 500 mls/hr IV ASDIRECTED [facility]


   Stop: 04/28/20 15:59


   Last Admin: 04/28/20 14:03 Dose:  500 mls/hr


Levofloxacin/Dextrose 750 mg/ (Premix)  150 mls @ 100 mls/hr IV Q24H [facility]


   Last Admin: 05/01/20 16:48 Dose:  100 mls/hr


Piperacillin/Tazobactam/ (Dextrose 3.375 gm/ Premix)  50 mls @ 100 mls/hr IV 

Q6H [facility]


   Last Admin: 05/02/20 09:16 Dose:  100 mls/hr


Dextrose/Lactated Ringer's (Dextrose 5%-Lactated Ringers)  1,000 mls @ 75 mls/

hr IV ASDIRECTED [facility]


   Last Admin: 04/30/20 22:55 Dose:  75 mls/hr


Potassium Chloride 20 meq/Lidocaine HCl 2 ml/ Sodium Chloride  112 mls @ 56 mls/

hr IV Q2H [facility]


   Stop: 04/30/20 13:59


   Last Admin: 04/30/20 12:01 Dose:  56 mls/hr


Sodium Chloride (Normal Saline)  250 mls @ 250 mls/hr IV ONETIME ONE


   Stop: 05/02/20 00:27


   Last Admin: 05/01/20 23:50 Dose:  250 mls/hr


Sodium Chloride (Normal Saline)  1,000 mls @ 500 mls/hr IV ASDIRECTED [facility]


   Last Admin: 05/02/20 11:31 Dose:  500 mls/hr


Sodium Chloride (Normal Saline)  1,000 mls @ 250 mls/hr IV ASDIRECTED [facility]


   Stop: 05/02/20 18:45


   Last Admin: 05/02/20 14:58 Dose:  250 mls/hr


Sodium Chloride (Normal Saline)  1,000 mls @ 125 mls/hr IV ASDIRECTED [facility]


Sodium Chloride (Normal Saline)  1,000 mls @ 125 mls/hr IV ASDIRECTED [facility]


   Stop: 05/03/20 03:30


   Last Admin: 05/02/20 19:59 Dose:  125 mls/hr


Levofloxacin/Dextrose 750 mg/ (Premix)  150 mls @ 100 mls/hr IV Q24H [facility]


   Last Admin: 05/06/20 07:53 Dose:  100 mls/hr


Piperacillin/Tazobactam/ (Dextrose 3.375 gm/ Premix)  50 mls @ 100 mls/hr IV 

Q6HR [facility]


   Last Admin: 05/06/20 03:39 Dose:  100 mls/hr


Vancomycin HCl 1 gm/ Sodium (Chloride)  250 mls @ 167 mls/hr IV Q12H [facility]


   Last Admin: 05/04/20 23:11 Dose:  167 mls/hr


Norepinephrine Bitartrate 4 mg (/ Dextrose/Water)  250 mls @ 7.5 mls/hr IV 

TITRATE [facility]; Protocol


   Last Titration: 05/04/20 17:07 Dose:  0 mcg/min, 0 mls/hr


Potassium Chloride 20 meq/Lidocaine HCl 2 ml/ Sodium Chloride  112 mls @ 56 mls/

hr IV Q2H [facility]


   Stop: 05/03/20 17:59


   Last Admin: 05/03/20 16:57 Dose:  56 mls/hr


Lactated Ringer's (Ringers, Lactated)  1,000 ml IRR .STK-MED ONE


   Stop: 04/26/20 09:16


   Last Admin: 04/26/20 09:15 Dose:  1,000 ml


Levofloxacin (Levaquin)  750 mg PO Q24H [facility]


Lidocaine HCl (Xylocaine 2% Jelly)  10 ml MUCMEM ONETIME ONE


   Stop: 05/02/20 05:53


   Last Admin: 05/02/20 06:13 Dose:  10 ml


Lidocaine/Epinephrine (Xylocaine 1% With Epinephrine 1:100,000) Confirm 

Administered Dose 50 ml .ROUTE .STK-MED ONE


   Stop: 04/26/20 08:35


   Last Admin: 04/26/20 09:29 Dose:  20 ml


Magnesium Oxide (Magnesium Oxide)  400 mg PO BID [facility]


   Last Admin: 05/05/20 08:35 Dose:  400 mg


Neostigmine Methylsulfate (Neostigmine) Confirm Administered Dose 5 mg .ROUTE 

.STK-MED ONE


   Stop: 04/26/20 08:42


Ondansetron HCl (Zofran) Confirm Administered Dose 4 mg .ROUTE .STK-MED ONE


   Stop: 04/26/20 08:42


Pantoprazole Sodium (Protonix***)  40 mg PO ACBREAKFAST [facility]


   Last Admin: 04/27/20 08:20 Dose:  40 mg


Pantoprazole Sodium (Protonix Granules***)  40 mg PO DAILY@0730 [facility]


   Last Admin: 05/05/20 08:35 Dose:  40 mg


Phenylephrine HCl (Niraj-Synephrine) Confirm Administered Dose 10 mg .ROUTE .STK-

MED ONE


   Stop: 04/26/20 09:05


Piperacillin Sod/Tazobactam Sod (Zosyn) Confirm Administered Dose 4.5 gm .ROUTE 

.STK-MED ONE


   Stop: 04/26/20 06:31


   Last Admin: 04/26/20 06:39 Dose:  Not Given


Potassium Chloride (Potassium Chloride)  40 meq PO ONETIME ONE


   Stop: 05/03/20 14:01


   Last Admin: 05/03/20 13:49 Dose:  40 meq


Prednisone (Prednisone)  40 mg PO ONETIME ONE


   Stop: 05/07/20 10:01


   Last Admin: 05/07/20 10:31 Dose:  40 mg


Propofol (Diprivan  20 Ml) Confirm Administered Dose 200 mg .ROUTE .STK-MED ONE


   Stop: 04/26/20 08:42


Quetiapine Fumarate (Seroquel)  50 mg PO TID [facility]


   Last Admin: 05/06/20 08:41 Dose:  50 mg


Rocuronium Bromide (Zemuron) Confirm Administered Dose 50 mg .ROUTE .STK-MED ONE


   Stop: 04/26/20 08:42


Succinylcholine Chloride (Quelicin) Confirm Administered Dose 200 mg .ROUTE .STK

-MED ONE


   Stop: 04/26/20 08:42


Vancomycin HCl (Vancomycin)  1 gm IV .PHARMACY TO DOSE [facility]


   Stop: 05/03/20 09:01











- Exam


Quality Assessment: Reports: Supplemental Oxygen


General: Reports: Alert, No Acute Distress, Lethargic.  Denies: Cooperative


Lungs: Reports: Normal Respiratory Effort, Crackles (right lower lung), 

Wheezing (mild right side )


Cardiovascular: Reports: Regular Rate, Regular Rhythm


GI/Abdominal Exam: Soft, No Distention


Extremities: No Pedal Edema


Psy/Mental Status: Reports: Alert.  Denies: Agitated